# Patient Record
Sex: FEMALE | Race: WHITE | Employment: UNEMPLOYED | URBAN - METROPOLITAN AREA
[De-identification: names, ages, dates, MRNs, and addresses within clinical notes are randomized per-mention and may not be internally consistent; named-entity substitution may affect disease eponyms.]

---

## 2020-02-03 ENCOUNTER — APPOINTMENT (OUTPATIENT)
Dept: GENERAL RADIOLOGY | Facility: CLINIC | Age: 60
End: 2020-02-03
Attending: EMERGENCY MEDICINE
Payer: MEDICAID

## 2020-02-03 ENCOUNTER — HOSPITAL ENCOUNTER (INPATIENT)
Facility: CLINIC | Age: 60
LOS: 2 days | Discharge: HOME | End: 2020-02-05
Attending: EMERGENCY MEDICINE | Admitting: PSYCHIATRY & NEUROLOGY
Payer: MEDICAID

## 2020-02-03 DIAGNOSIS — F31.81 BIPOLAR 2 DISORDER (H): ICD-10-CM

## 2020-02-03 DIAGNOSIS — R45.851 SUICIDAL IDEATION: ICD-10-CM

## 2020-02-03 DIAGNOSIS — J44.9 CHRONIC OBSTRUCTIVE PULMONARY DISEASE, UNSPECIFIED COPD TYPE (H): ICD-10-CM

## 2020-02-03 DIAGNOSIS — F17.200 NICOTINE DEPENDENCE, UNCOMPLICATED, UNSPECIFIED NICOTINE PRODUCT TYPE: ICD-10-CM

## 2020-02-03 DIAGNOSIS — F10.939 ALCOHOL WITHDRAWAL WITH COMPLICATION WITH INPATIENT TREATMENT, WITH UNSPECIFIED COMPLICATION (H): ICD-10-CM

## 2020-02-03 DIAGNOSIS — K21.9 GASTROESOPHAGEAL REFLUX DISEASE, ESOPHAGITIS PRESENCE NOT SPECIFIED: Primary | ICD-10-CM

## 2020-02-03 DIAGNOSIS — F41.1 GAD (GENERALIZED ANXIETY DISORDER): ICD-10-CM

## 2020-02-03 DIAGNOSIS — F10.939 ALCOHOL WITHDRAWAL SYNDROME WITH COMPLICATION (H): ICD-10-CM

## 2020-02-03 LAB
ALBUMIN SERPL-MCNC: 3.9 G/DL (ref 3.4–5)
ALCOHOL BREATH TEST: 0.01 (ref 0–0.01)
ALP SERPL-CCNC: 130 U/L (ref 40–150)
ALT SERPL W P-5'-P-CCNC: 25 U/L (ref 0–50)
ANION GAP SERPL CALCULATED.3IONS-SCNC: 9 MMOL/L (ref 3–14)
AST SERPL W P-5'-P-CCNC: 33 U/L (ref 0–45)
BASOPHILS # BLD AUTO: 0 10E9/L (ref 0–0.2)
BASOPHILS NFR BLD AUTO: 0.2 %
BILIRUB SERPL-MCNC: 0.4 MG/DL (ref 0.2–1.3)
BUN SERPL-MCNC: 17 MG/DL (ref 7–30)
CALCIUM SERPL-MCNC: 9.6 MG/DL (ref 8.5–10.1)
CHLORIDE SERPL-SCNC: 103 MMOL/L (ref 94–109)
CHOLEST SERPL-MCNC: 257 MG/DL
CO2 SERPL-SCNC: 30 MMOL/L (ref 20–32)
CREAT SERPL-MCNC: 0.52 MG/DL (ref 0.52–1.04)
DIFFERENTIAL METHOD BLD: ABNORMAL
EOSINOPHIL # BLD AUTO: 0 10E9/L (ref 0–0.7)
EOSINOPHIL NFR BLD AUTO: 0.1 %
ERYTHROCYTE [DISTWIDTH] IN BLOOD BY AUTOMATED COUNT: 15.2 % (ref 10–15)
GFR SERPL CREATININE-BSD FRML MDRD: >90 ML/MIN/{1.73_M2}
GLUCOSE SERPL-MCNC: 157 MG/DL (ref 70–99)
HCT VFR BLD AUTO: 40.5 % (ref 35–47)
HDLC SERPL-MCNC: 125 MG/DL
HGB BLD-MCNC: 14.2 G/DL (ref 11.7–15.7)
IMM GRANULOCYTES # BLD: 0 10E9/L (ref 0–0.4)
IMM GRANULOCYTES NFR BLD: 0.2 %
LDLC SERPL CALC-MCNC: 115 MG/DL
LYMPHOCYTES # BLD AUTO: 1.3 10E9/L (ref 0.8–5.3)
LYMPHOCYTES NFR BLD AUTO: 10.1 %
MAGNESIUM SERPL-MCNC: 1.8 MG/DL (ref 1.6–2.3)
MCH RBC QN AUTO: 32.7 PG (ref 26.5–33)
MCHC RBC AUTO-ENTMCNC: 35.1 G/DL (ref 31.5–36.5)
MCV RBC AUTO: 93 FL (ref 78–100)
MONOCYTES # BLD AUTO: 0.7 10E9/L (ref 0–1.3)
MONOCYTES NFR BLD AUTO: 5.7 %
NEUTROPHILS # BLD AUTO: 10.3 10E9/L (ref 1.6–8.3)
NEUTROPHILS NFR BLD AUTO: 83.7 %
NONHDLC SERPL-MCNC: 132 MG/DL
NRBC # BLD AUTO: 0 10*3/UL
NRBC BLD AUTO-RTO: 0 /100
OSMOLALITY SERPL: 335 MMOL/KG (ref 275–295)
PLATELET # BLD AUTO: 276 10E9/L (ref 150–450)
POTASSIUM SERPL-SCNC: 3.7 MMOL/L (ref 3.4–5.3)
PROT SERPL-MCNC: 8.4 G/DL (ref 6.8–8.8)
RBC # BLD AUTO: 4.34 10E12/L (ref 3.8–5.2)
SODIUM SERPL-SCNC: 142 MMOL/L (ref 133–144)
TRIGL SERPL-MCNC: 84 MG/DL
TSH SERPL DL<=0.005 MIU/L-ACNC: 1.29 MU/L (ref 0.4–4)
WBC # BLD AUTO: 12.4 10E9/L (ref 4–11)

## 2020-02-03 PROCEDURE — 71046 X-RAY EXAM CHEST 2 VIEWS: CPT

## 2020-02-03 PROCEDURE — 25000128 H RX IP 250 OP 636: Performed by: PSYCHIATRY & NEUROLOGY

## 2020-02-03 PROCEDURE — 25000132 ZZH RX MED GY IP 250 OP 250 PS 637: Performed by: EMERGENCY MEDICINE

## 2020-02-03 PROCEDURE — 83735 ASSAY OF MAGNESIUM: CPT | Performed by: EMERGENCY MEDICINE

## 2020-02-03 PROCEDURE — 99285 EMERGENCY DEPT VISIT HI MDM: CPT | Mod: Z6 | Performed by: EMERGENCY MEDICINE

## 2020-02-03 PROCEDURE — 25000128 H RX IP 250 OP 636: Performed by: FAMILY MEDICINE

## 2020-02-03 PROCEDURE — 90791 PSYCH DIAGNOSTIC EVALUATION: CPT

## 2020-02-03 PROCEDURE — 82607 VITAMIN B-12: CPT | Performed by: EMERGENCY MEDICINE

## 2020-02-03 PROCEDURE — 12800008 ZZH R&B CD ADULT

## 2020-02-03 PROCEDURE — 25000132 ZZH RX MED GY IP 250 OP 250 PS 637: Performed by: PSYCHIATRY & NEUROLOGY

## 2020-02-03 PROCEDURE — 80061 LIPID PANEL: CPT | Performed by: EMERGENCY MEDICINE

## 2020-02-03 PROCEDURE — HZ2ZZZZ DETOXIFICATION SERVICES FOR SUBSTANCE ABUSE TREATMENT: ICD-10-PCS | Performed by: EMERGENCY MEDICINE

## 2020-02-03 PROCEDURE — 25000132 ZZH RX MED GY IP 250 OP 250 PS 637: Performed by: CLINICAL NURSE SPECIALIST

## 2020-02-03 PROCEDURE — 99285 EMERGENCY DEPT VISIT HI MDM: CPT | Mod: 25

## 2020-02-03 PROCEDURE — 84443 ASSAY THYROID STIM HORMONE: CPT | Performed by: EMERGENCY MEDICINE

## 2020-02-03 PROCEDURE — 25000128 H RX IP 250 OP 636: Performed by: EMERGENCY MEDICINE

## 2020-02-03 PROCEDURE — 85025 COMPLETE CBC W/AUTO DIFF WBC: CPT | Performed by: EMERGENCY MEDICINE

## 2020-02-03 PROCEDURE — 80053 COMPREHEN METABOLIC PANEL: CPT | Performed by: EMERGENCY MEDICINE

## 2020-02-03 PROCEDURE — 83930 ASSAY OF BLOOD OSMOLALITY: CPT | Performed by: EMERGENCY MEDICINE

## 2020-02-03 PROCEDURE — 82075 ASSAY OF BREATH ETHANOL: CPT

## 2020-02-03 RX ORDER — LANOLIN ALCOHOL/MO/W.PET/CERES
100 CREAM (GRAM) TOPICAL ONCE
Status: COMPLETED | OUTPATIENT
Start: 2020-02-03 | End: 2020-02-03

## 2020-02-03 RX ORDER — ACETAMINOPHEN 325 MG/1
650 TABLET ORAL EVERY 4 HOURS PRN
Status: DISCONTINUED | OUTPATIENT
Start: 2020-02-03 | End: 2020-02-05 | Stop reason: HOSPADM

## 2020-02-03 RX ORDER — FOLIC ACID 1 MG/1
1 TABLET ORAL ONCE
Status: COMPLETED | OUTPATIENT
Start: 2020-02-03 | End: 2020-02-03

## 2020-02-03 RX ORDER — FOLIC ACID 1 MG/1
1 TABLET ORAL ONCE
Status: DISCONTINUED | OUTPATIENT
Start: 2020-02-03 | End: 2020-02-03

## 2020-02-03 RX ORDER — NICOTINE 21 MG/24HR
1 PATCH, TRANSDERMAL 24 HOURS TRANSDERMAL DAILY
Status: DISCONTINUED | OUTPATIENT
Start: 2020-02-03 | End: 2020-02-05 | Stop reason: HOSPADM

## 2020-02-03 RX ORDER — ONDANSETRON 4 MG/1
4 TABLET, ORALLY DISINTEGRATING ORAL EVERY 6 HOURS PRN
Status: DISCONTINUED | OUTPATIENT
Start: 2020-02-03 | End: 2020-02-05 | Stop reason: HOSPADM

## 2020-02-03 RX ORDER — TRAZODONE HYDROCHLORIDE 50 MG/1
50 TABLET, FILM COATED ORAL
Status: DISCONTINUED | OUTPATIENT
Start: 2020-02-03 | End: 2020-02-04

## 2020-02-03 RX ORDER — LANOLIN ALCOHOL/MO/W.PET/CERES
100 CREAM (GRAM) TOPICAL ONCE
Status: DISCONTINUED | OUTPATIENT
Start: 2020-02-03 | End: 2020-02-03

## 2020-02-03 RX ORDER — HYDROXYZINE HYDROCHLORIDE 25 MG/1
25 TABLET, FILM COATED ORAL EVERY 4 HOURS PRN
Status: DISCONTINUED | OUTPATIENT
Start: 2020-02-03 | End: 2020-02-05 | Stop reason: HOSPADM

## 2020-02-03 RX ORDER — ONDANSETRON 4 MG/1
4 TABLET, ORALLY DISINTEGRATING ORAL ONCE
Status: COMPLETED | OUTPATIENT
Start: 2020-02-03 | End: 2020-02-03

## 2020-02-03 RX ORDER — DIAZEPAM 5 MG
5-20 TABLET ORAL EVERY 30 MIN PRN
Status: DISCONTINUED | OUTPATIENT
Start: 2020-02-03 | End: 2020-02-05 | Stop reason: HOSPADM

## 2020-02-03 RX ORDER — MULTIPLE VITAMINS W/ MINERALS TAB 9MG-400MCG
1 TAB ORAL DAILY
Status: DISCONTINUED | OUTPATIENT
Start: 2020-02-04 | End: 2020-02-05 | Stop reason: HOSPADM

## 2020-02-03 RX ORDER — DIAZEPAM 5 MG
5-20 TABLET ORAL EVERY 30 MIN PRN
Status: DISCONTINUED | OUTPATIENT
Start: 2020-02-03 | End: 2020-02-03

## 2020-02-03 RX ORDER — NICOTINE POLACRILEX 4 MG/1
20 GUM, CHEWING ORAL 2 TIMES DAILY
Status: ON HOLD | COMMUNITY
End: 2020-02-05

## 2020-02-03 RX ORDER — LANOLIN ALCOHOL/MO/W.PET/CERES
100 CREAM (GRAM) TOPICAL DAILY
Status: DISCONTINUED | OUTPATIENT
Start: 2020-02-04 | End: 2020-02-05 | Stop reason: HOSPADM

## 2020-02-03 RX ORDER — PROCHLORPERAZINE MALEATE 5 MG
5-10 TABLET ORAL EVERY 6 HOURS PRN
Status: DISCONTINUED | OUTPATIENT
Start: 2020-02-03 | End: 2020-02-05 | Stop reason: HOSPADM

## 2020-02-03 RX ORDER — ATENOLOL 50 MG/1
50 TABLET ORAL DAILY PRN
Status: DISCONTINUED | OUTPATIENT
Start: 2020-02-03 | End: 2020-02-05 | Stop reason: HOSPADM

## 2020-02-03 RX ORDER — LANOLIN ALCOHOL/MO/W.PET/CERES
100 CREAM (GRAM) TOPICAL DAILY
Status: DISCONTINUED | OUTPATIENT
Start: 2020-02-03 | End: 2020-02-03

## 2020-02-03 RX ORDER — LOPERAMIDE HCL 2 MG
2 CAPSULE ORAL 4 TIMES DAILY PRN
Status: DISCONTINUED | OUTPATIENT
Start: 2020-02-03 | End: 2020-02-05 | Stop reason: HOSPADM

## 2020-02-03 RX ORDER — MULTIPLE VITAMINS W/ MINERALS TAB 9MG-400MCG
1 TAB ORAL DAILY
Status: DISCONTINUED | OUTPATIENT
Start: 2020-02-03 | End: 2020-02-03

## 2020-02-03 RX ORDER — ALBUTEROL SULFATE 90 UG/1
2 AEROSOL, METERED RESPIRATORY (INHALATION) EVERY 6 HOURS PRN
Status: DISCONTINUED | OUTPATIENT
Start: 2020-02-03 | End: 2020-02-05 | Stop reason: HOSPADM

## 2020-02-03 RX ORDER — FOLIC ACID 1 MG/1
1 TABLET ORAL DAILY
Status: DISCONTINUED | OUTPATIENT
Start: 2020-02-04 | End: 2020-02-05 | Stop reason: HOSPADM

## 2020-02-03 RX ORDER — ALUMINA, MAGNESIA, AND SIMETHICONE 2400; 2400; 240 MG/30ML; MG/30ML; MG/30ML
30 SUSPENSION ORAL EVERY 4 HOURS PRN
Status: DISCONTINUED | OUTPATIENT
Start: 2020-02-03 | End: 2020-02-05 | Stop reason: HOSPADM

## 2020-02-03 RX ORDER — FOLIC ACID 1 MG/1
1 TABLET ORAL DAILY
Status: DISCONTINUED | OUTPATIENT
Start: 2020-02-03 | End: 2020-02-03

## 2020-02-03 RX ORDER — MAGNESIUM OXIDE 400 MG/1
400 TABLET ORAL 2 TIMES DAILY
COMMUNITY
End: 2022-03-14

## 2020-02-03 RX ADMIN — DIAZEPAM 5 MG: 5 TABLET ORAL at 11:57

## 2020-02-03 RX ADMIN — PROCHLORPERAZINE MALEATE 5 MG: 5 TABLET ORAL at 18:43

## 2020-02-03 RX ADMIN — ALBUTEROL SULFATE 2 PUFF: 90 AEROSOL, METERED RESPIRATORY (INHALATION) at 19:40

## 2020-02-03 RX ADMIN — NICOTINE 1 PATCH: 21 PATCH, EXTENDED RELEASE TRANSDERMAL at 14:57

## 2020-02-03 RX ADMIN — OMEPRAZOLE 20 MG: 20 CAPSULE, DELAYED RELEASE ORAL at 16:21

## 2020-02-03 RX ADMIN — MULTIPLE VITAMINS W/ MINERALS TAB 1 TABLET: TAB at 09:04

## 2020-02-03 RX ADMIN — DIAZEPAM 10 MG: 5 TABLET ORAL at 07:44

## 2020-02-03 RX ADMIN — ONDANSETRON 4 MG: 4 TABLET, ORALLY DISINTEGRATING ORAL at 14:55

## 2020-02-03 RX ADMIN — ONDANSETRON 4 MG: 4 TABLET, ORALLY DISINTEGRATING ORAL at 07:40

## 2020-02-03 RX ADMIN — DIAZEPAM 5 MG: 5 TABLET ORAL at 09:13

## 2020-02-03 RX ADMIN — ONDANSETRON 4 MG: 4 TABLET, ORALLY DISINTEGRATING ORAL at 11:57

## 2020-02-03 RX ADMIN — DIAZEPAM 10 MG: 5 TABLET ORAL at 19:32

## 2020-02-03 RX ADMIN — DIAZEPAM 10 MG: 5 TABLET ORAL at 16:50

## 2020-02-03 RX ADMIN — TRAZODONE HYDROCHLORIDE 50 MG: 50 TABLET ORAL at 19:34

## 2020-02-03 RX ADMIN — FOLIC ACID 1 MG: 1 TABLET ORAL at 09:04

## 2020-02-03 RX ADMIN — THIAMINE HCL TAB 100 MG 100 MG: 100 TAB at 09:04

## 2020-02-03 RX ADMIN — DIAZEPAM 10 MG: 5 TABLET ORAL at 10:39

## 2020-02-03 RX ADMIN — DIAZEPAM 10 MG: 5 TABLET ORAL at 14:55

## 2020-02-03 RX ADMIN — DIAZEPAM 10 MG: 5 TABLET ORAL at 13:08

## 2020-02-03 ASSESSMENT — ENCOUNTER SYMPTOMS
DYSPHORIC MOOD: 1
ABDOMINAL PAIN: 0
BLOOD IN STOOL: 0
DIARRHEA: 1
SHORTNESS OF BREATH: 0
NAUSEA: 1
VOMITING: 1
COUGH: 1
FEVER: 0

## 2020-02-03 ASSESSMENT — MIFFLIN-ST. JEOR: SCORE: 1444.98

## 2020-02-03 NOTE — ED NOTES
"Drank a few sips of rubbing alcohol than immediately \"threw up.\"  Per pt drank rubbing alcohol to \"stop the withdrawals.  "

## 2020-02-03 NOTE — PROGRESS NOTES
ISHAN      S = Situation: The patient is a 59 year-old female who comes to us from the ED to be detoxed from alcohol.  She has been drinking a liter a day Her last drink was  Yesterday.  She does not use any other drugs.  Her first MSSA was a 12 and she was given Valium 10 mg.     B  = Background: The patient lives by herself in a house.  She is a retired .  Her stressors are that she took care of her mother who  a year ago.  Then her brother .  He didn't have a will and the patient is now dealing with legal issues.  She has had one past inpatient treatment and one OP at [facility].  She has been treated as an outpatient for depression and anxiety.  She has a history of multiple operations for infertility and small bowel obstructions.  No other known medical issues.     A  =  Assessment: The patient is in active withdrawal.  She is anxious and has a history of panic attacks.       R =   Request or Recommendation: Obtained admission orders. Provided with snack. Counseled on smoking cessation. Introduced to IM&R Treatment program. Educated on Unit routines and expectations.

## 2020-02-03 NOTE — ED NOTES
Bed: ED16B  Expected date: 2/3/20  Expected time: 6:00 AM  Means of arrival:   Comments:  N716  59 F  SI/nausea

## 2020-02-03 NOTE — PROGRESS NOTES
02/03/20 1451   Valuables   Patient Belongings other (see comments)   Patient Belongings Remaining with Patient other (see comments)   Patient Belongings Put in Hospital Secure Location (Security or Locker, etc.) other (see comments)   Did you bring any home meds/supplements to the hospital?  No   Storage Bin: Jacket, purse w/ various belongings  Med Room: Wallet, cell phone, cigarettes, lighter, gold chain necklace    Security #[id number]: 1 [place] DL, 1 [employer] card, 1 [employer] card, 1  card, 1 Checkbook (begins w/ 5602) $6.00 CASH    CONTRABAND: 2 pocket knives, pepper spray    A               Admission:  I am responsible for any personal items that are not sent to the safe or pharmacy.  [facility] is not responsible for loss, theft or damage of any property in my possession.    Signature:  _________________________________ Date: _______  Time: _____                                              Staff Signature:  ____________________________ Date: ________  Time: _____      2nd Staff person, if patient is unable/unwilling to sign:    Signature: ________________________________ Date: ________  Time: _____     Discharge:  [facility] has returned all of my personal belongings:    Signature: _________________________________ Date: ________  Time: _____                                          Staff Signature:  ____________________________ Date: ________  Time: _____

## 2020-02-03 NOTE — ED NOTES
.  ED to Behavioral Floor Handoff    SITUATION  Chuyita Bailey is a 59 year old female who speaks English and lives in a home with others The patient arrived in the ED by ambulance from emergency room with a complaint of Suicidal (SI, ETOH withdrawal.)  .The patient's current symptoms started/worsened 2 month(s) ago and during this time the symptoms have increased.   In the ED, pt was diagnosed with   Final diagnoses:   None        Initial vitals were: BP: (!) 146/77  Heart Rate: 100  Temp: 96.9  F (36.1  C)  Resp: 16  SpO2: 94 %   --------  Is the patient diabetic? No   If yes, last blood glucose? --     If yes, was this treated in the ED? --  --------  Is the patient inebriated (ETOH) No or Impaired on other substances? No  MSSA done? Yes  Last MSSA score: --    Were withdrawal symptoms treated? Yes  Does the patient have a seizure history? No. If yes, date of most recent seizure--  --------  Is the patient patient experiencing suicidal ideation? reports occasional suicidal thoughts representing feeling that life is not worth feeling    Homicidal ideation? denies current or recent homicidal ideation or behaviors.    Self-injurious behavior/urges? denies current or recent self injurious behavior or ideation.  ------  Was pt aggressive in the ED No  Was a code called No  Is the pt now cooperative? Yes  -------  Meds given in ED:   Medications   diazepam (VALIUM) tablet 5-20 mg (10 mg Oral Given 2/3/20 0744)   multivitamin w/minerals (THERA-VIT-M) tablet 1 tablet (has no administration in time range)   vitamin B1 (THIAMINE) tablet 100 mg (has no administration in time range)   folic acid (FOLVITE) tablet 1 mg (has no administration in time range)   ondansetron (ZOFRAN-ODT) ODT tab 4 mg (4 mg Oral Given 2/3/20 0740)      Family present during ED course? No  Family currently present? No    BACKGROUND  Does the patient have a cognitive impairment or developmental disability? No  Allergies:   Allergies   Allergen  Reactions     Doxycycline Nausea and Vomiting     Keflex [Cephalexin] Nausea and Vomiting     Sulfa Drugs Hives   .   Social demographics are   Social History     Socioeconomic History     Marital status: None     Spouse name: None     Number of children: None     Years of education: None     Highest education level: None   Occupational History     None   Social Needs     Financial resource strain: None     Food insecurity:     Worry: None     Inability: None     Transportation needs:     Medical: None     Non-medical: None   Tobacco Use     Smoking status: Current Every Day Smoker     Packs/day: 2.00     Smokeless tobacco: Never Used   Substance and Sexual Activity     Alcohol use: Yes     Comment: 1 liter vodka per day     Drug use: Not Currently     Sexual activity: Yes   Lifestyle     Physical activity:     Days per week: None     Minutes per session: None     Stress: None   Relationships     Social connections:     Talks on phone: None     Gets together: None     Attends Latter day service: None     Active member of club or organization: None     Attends meetings of clubs or organizations: None     Relationship status: None     Intimate partner violence:     Fear of current or ex partner: None     Emotionally abused: None     Physically abused: None     Forced sexual activity: None   Other Topics Concern     None   Social History Narrative     None        ASSESSMENT  Labs results   Labs Ordered and Resulted from Time of ED Arrival Up to the Time of Departure from the ED   CBC WITH PLATELETS DIFFERENTIAL - Abnormal; Notable for the following components:       Result Value    WBC 12.4 (*)     RDW 15.2 (*)     Absolute Neutrophil 10.3 (*)     All other components within normal limits   COMPREHENSIVE METABOLIC PANEL - Abnormal; Notable for the following components:    Glucose 157 (*)     All other components within normal limits   OSMOLALITY   DRUG ABUSE SCREEN 6 CHEM DEP URINE ([facility])   MSSA SCORE AND VS   NOTIFY       Imaging Studies: No results found for this or any previous visit (from the past 24 hour(s)).   Most recent vital signs BP (!) 146/77   Temp 96.9  F (36.1  C) (Oral)   Resp 16   SpO2 94%    Abnormal labs/tests/findings requiring intervention:---   Pain control: good  Nausea control: good    RECOMMENDATION  Are any infection precautions needed (MRSA, VRE, etc.)? No If yes, what infection? --  ---  Does the patient have mobility issues? independently. If yes, what device does the pt use? ---  ---  Is patient on 72 hour hold or commitment? No If on 72 hour hold, have hold and rights been given to patient? No  Are admitting orders written if after 10 p.m. ?N/A  Tasks needing to be completed:---     Vinicio Ge RN   [facility]   [phone] [facility]   [phone] [facility]

## 2020-02-03 NOTE — ED PROVIDER NOTES
History     Chief Complaint   Patient presents with     Suicidal     SI, ETOH withdrawal.     HPI  Chuyita Bailey is a 59 year old female with PMH chronic alcohol abuse, depression, previous suicide attempt, HTN, and asthma who p/w c/o alcohol withdrawal and SI.  Patient normally drinks a liter of vodka daily.  She reports her last drink was 24 hours ago.  She is currently experiencing symptoms of withdrawal that include nausea, vomiting, shakiness, diarrhea.  She denies blood in her stool or vomit.  Denies fever.  On review of systems, she reports a right lower tooth ache and a fractured crown that has been present for a few weeks and has not been associated with fever or purulent drainage. Patient has been told she needs to have a repeat root canal on this tooth. She also reports a recent productive cough but denies chest pain or shortness of breath.  She denies a history of alcohol withdrawal seizures.  Denies other drug use.  She does report passive suicidal ideation but does not have a plan and would not act on it.  She is currently going through bereavement with the recent loss of 2 close family members. She did drink a few sips of rubbing alcohol prior to arrival but spit this out. She was not trying to harm herself but was attempting to treat withdrawal symptoms and had run out of ethanol in home. Reports mild discomfort in back of throat but has been tolerating PO without difficulty while in ED.      I have reviewed the Medications, Allergies, Past Medical and Surgical History, and Social History in the Epic system.    Review of Systems   Constitutional: Negative for fever.   HENT: Positive for dental problem.    Respiratory: Positive for cough. Negative for shortness of breath.    Cardiovascular: Negative for chest pain.   Gastrointestinal: Positive for diarrhea, nausea and vomiting. Negative for abdominal pain and blood in stool.   Psychiatric/Behavioral: Positive for dysphoric mood and suicidal  ideas. Negative for self-injury.   All other systems reviewed and are negative.      Physical Exam   BP: (!) 146/77  Pulse: 109  Heart Rate: 100  Temp: 96.9  F (36.1  C)  Resp: 16  SpO2: 94 %      Physical Exam  Vitals signs and nursing note reviewed.   Constitutional:       General: She is not in acute distress.     Appearance: She is well-developed. She is not ill-appearing or diaphoretic.      Comments: disheveled   HENT:      Head: Normocephalic and atraumatic.      Jaw: No trismus, swelling or malocclusion.      Nose: Nose normal. No rhinorrhea.      Mouth/Throat:      Mouth: Mucous membranes are dry.      Dentition: Abnormal dentition. No dental abscesses.      Pharynx: Oropharynx is clear. No oropharyngeal exudate.     Eyes:      General: No scleral icterus.     Conjunctiva/sclera: Conjunctivae normal.      Pupils: Pupils are equal, round, and reactive to light.   Neck:      Musculoskeletal: Normal range of motion and neck supple. No neck rigidity.   Cardiovascular:      Rate and Rhythm: Regular rhythm. Tachycardia present.      Heart sounds: Normal heart sounds. No murmur.   Pulmonary:      Effort: Pulmonary effort is normal. No respiratory distress.      Breath sounds: Normal breath sounds. No stridor. No wheezing, rhonchi or rales.   Abdominal:      General: There is no distension.      Palpations: Abdomen is soft.      Tenderness: There is no abdominal tenderness. There is no guarding or rebound.   Musculoskeletal: Normal range of motion.         General: No deformity.   Skin:     General: Skin is warm and dry.      Coloration: Skin is not jaundiced or pale.      Findings: No rash.   Neurological:      General: No focal deficit present.      Mental Status: She is alert and oriented to person, place, and time.   Psychiatric:         Attention and Perception: Attention normal.         Mood and Affect: Mood normal.         Speech: Speech normal.         Behavior: Behavior normal. Behavior is cooperative.          Thought Content: Thought content is not paranoid. Thought content includes suicidal ideation. Thought content does not include suicidal plan.         ED Course        Procedures             Critical Care time:  none             Labs Ordered and Resulted from Time of ED Arrival Up to the Time of Departure from the ED   CBC WITH PLATELETS DIFFERENTIAL - Abnormal; Notable for the following components:       Result Value    WBC 12.4 (*)     RDW 15.2 (*)     Absolute Neutrophil 10.3 (*)     All other components within normal limits   COMPREHENSIVE METABOLIC PANEL - Abnormal; Notable for the following components:    Glucose 157 (*)     All other components within normal limits   OSMOLALITY - Abnormal; Notable for the following components:    Osmolality 335 (*)     All other components within normal limits   ALCOHOL BREATH TEST POCT - Normal   DRUG ABUSE SCREEN 6 CHEM DEP URINE ([facility])   MSSA SCORE AND VS   NOTIFY            Assessments & Plan (with Medical Decision Making)   Chuyita Bailey is a 59 year old female with PMH chronic alcohol abuse, depression, previous suicide attempt, HTN, and asthma who p/w c/o alcohol withdrawal and SI.     Ddx: SI, bereavement, alcohol withdrawal, dehydration, electrolyte abnormality, pancreatitis, hepatitis, pna, asthma exacerbation/URI    [facility] evaluated patient and was able to have her contract for safety. SI is very passive and I am not concerned that she is unsafe to go to detox. She does desire detox and treatment for alcohol. No e/o toxic alcohol poisoning. Labs wnl. Given valium per MSSA and vitamins. CXR clear. S/p hysterectomy. Medically cleared for ongoing treatment in detox unit.     I have reviewed the nursing notes.    I have reviewed the findings, diagnosis, plan and need for follow up with the patient.    New Prescriptions    No medications on file       Final diagnoses:   Alcohol withdrawal syndrome with complication (H)   Suicidal ideation       2/3/2020   [facility],  [place], EMERGENCY DEPARTMENT     Pita Wu MD  02/03/20 0996

## 2020-02-04 ENCOUNTER — APPOINTMENT (OUTPATIENT)
Dept: CARDIOLOGY | Facility: CLINIC | Age: 60
End: 2020-02-04
Attending: PHYSICIAN ASSISTANT
Payer: MEDICAID

## 2020-02-04 LAB
FOLATE SERPL-MCNC: 12.6 NG/ML
NT-PROBNP SERPL-MCNC: 88 PG/ML (ref 0–900)
VIT B12 SERPL-MCNC: 362 PG/ML (ref 193–986)

## 2020-02-04 PROCEDURE — 99222 1ST HOSP IP/OBS MODERATE 55: CPT | Performed by: PHYSICIAN ASSISTANT

## 2020-02-04 PROCEDURE — 83880 ASSAY OF NATRIURETIC PEPTIDE: CPT | Performed by: PHYSICIAN ASSISTANT

## 2020-02-04 PROCEDURE — 93306 TTE W/DOPPLER COMPLETE: CPT | Mod: 26 | Performed by: INTERNAL MEDICINE

## 2020-02-04 PROCEDURE — 93306 TTE W/DOPPLER COMPLETE: CPT

## 2020-02-04 PROCEDURE — 36415 COLL VENOUS BLD VENIPUNCTURE: CPT | Performed by: PSYCHIATRY & NEUROLOGY

## 2020-02-04 PROCEDURE — 99207 ZZC CONSULT E&M CHANGED TO INITIAL LEVEL: CPT | Performed by: PHYSICIAN ASSISTANT

## 2020-02-04 PROCEDURE — 93005 ELECTROCARDIOGRAM TRACING: CPT

## 2020-02-04 PROCEDURE — 25000132 ZZH RX MED GY IP 250 OP 250 PS 637: Performed by: PSYCHIATRY & NEUROLOGY

## 2020-02-04 PROCEDURE — 93010 ELECTROCARDIOGRAM REPORT: CPT | Performed by: INTERNAL MEDICINE

## 2020-02-04 PROCEDURE — 12800008 ZZH R&B CD ADULT

## 2020-02-04 PROCEDURE — 82746 ASSAY OF FOLIC ACID SERUM: CPT | Performed by: PSYCHIATRY & NEUROLOGY

## 2020-02-04 RX ORDER — LAMOTRIGINE 25 MG/1
25 TABLET ORAL DAILY
Status: DISCONTINUED | OUTPATIENT
Start: 2020-02-04 | End: 2020-02-05 | Stop reason: HOSPADM

## 2020-02-04 RX ADMIN — SERTRALINE HYDROCHLORIDE 50 MG: 50 TABLET ORAL at 13:53

## 2020-02-04 RX ADMIN — ACETAMINOPHEN 650 MG: 325 TABLET, FILM COATED ORAL at 05:45

## 2020-02-04 RX ADMIN — FOLIC ACID 1 MG: 1 TABLET ORAL at 09:09

## 2020-02-04 RX ADMIN — NICOTINE 1 PATCH: 21 PATCH, EXTENDED RELEASE TRANSDERMAL at 09:09

## 2020-02-04 RX ADMIN — HYDROXYZINE HYDROCHLORIDE 25 MG: 25 TABLET, FILM COATED ORAL at 21:46

## 2020-02-04 RX ADMIN — OMEPRAZOLE 20 MG: 20 CAPSULE, DELAYED RELEASE ORAL at 09:08

## 2020-02-04 RX ADMIN — THIAMINE HCL TAB 100 MG 100 MG: 100 TAB at 09:08

## 2020-02-04 RX ADMIN — OMEPRAZOLE 20 MG: 20 CAPSULE, DELAYED RELEASE ORAL at 16:38

## 2020-02-04 RX ADMIN — HYDROXYZINE HYDROCHLORIDE 25 MG: 25 TABLET, FILM COATED ORAL at 16:38

## 2020-02-04 RX ADMIN — LAMOTRIGINE 25 MG: 25 TABLET ORAL at 13:53

## 2020-02-04 RX ADMIN — ACETAMINOPHEN 650 MG: 325 TABLET, FILM COATED ORAL at 09:09

## 2020-02-04 RX ADMIN — DIAZEPAM 10 MG: 5 TABLET ORAL at 01:06

## 2020-02-04 RX ADMIN — HYDROXYZINE HYDROCHLORIDE 25 MG: 25 TABLET, FILM COATED ORAL at 09:09

## 2020-02-04 RX ADMIN — DIAZEPAM 10 MG: 5 TABLET ORAL at 05:45

## 2020-02-04 RX ADMIN — MULTIPLE VITAMINS W/ MINERALS TAB 1 TABLET: TAB at 09:09

## 2020-02-04 RX ADMIN — ACETAMINOPHEN 650 MG: 325 TABLET, FILM COATED ORAL at 21:46

## 2020-02-04 ASSESSMENT — ACTIVITIES OF DAILY LIVING (ADL)
LAUNDRY: WITH SUPERVISION
HYGIENE/GROOMING: INDEPENDENT
DRESS: SCRUBS (BEHAVIORAL HEALTH)
ORAL_HYGIENE: INDEPENDENT

## 2020-02-04 NOTE — PROGRESS NOTES
"Pt reports plan for after detox is complete is to \"get support from my family and go back to the [facility]\". Pt denies any suicidal ideation plans or intent. Endorses anxiety 5, depression 7 of 10. Also endorses feeling some sadness but does endors feeling some happiness and hopefulness. Appears disheveled and untidy in appearance, neglected grooming. Is isolative to her room most of the shift apparently sleeping in her bed. MSSA scores today are 6 and 6.  "

## 2020-02-04 NOTE — PLAN OF CARE
Behavioral Team Discussion: (2/4/2020)    Continued Stay Criteria/Rationale: Patient admitted for alcohol withdrawal, complicated.  Plan: The following services will be provided to the patient; psychiatric assessment, medication management, therapeutic milieu, individual and group support, and skills groups.   Participants: 3A Provider: Dr. Philip Kim MD; 3A RN's: Fredrick Mahajan RN; 3A CM's: Cathi Kellogg.  Summary/Recommendation: Providers will assess today for treatment recommendations, discharge planning, and aftercare plans. CM will meet with pt for discharge planning.   Medical/Physical: Internal medicine consult completed 2/4/2020 AM by Claudia Dow PA-C.   Precautions:   Behavioral Orders   Procedures     Code 1 - Restrict to Unit     Routine Programming     As clinically indicated     Status 15     Every 15 minutes.     Withdrawal precautions     Rationale for change in precautions or plan: N/A  Progress: No Change.

## 2020-02-04 NOTE — PHARMACY-ADMISSION MEDICATION HISTORY
Admission Medication History status for the 2/3/2020 admission is complete.  See EPIC admission navigator for Prior to Admission medications.  Patient's Name: Chuyita Bailey  Patient's : 1960   59 year old, female    Medication History Sources: Patient and care everywhere  Primary Pharmacy: [facility] [address]    Medication history source reliability: Moderate  Medication adherence:  Moderate    Changes made to PTA medication list (reason)  Added: Omeprazole  Deleted: None  Changed: Magnesium to Magnesium oxide 400 mg per care everywhere    High Risk Medications (Warfarin, Immunosuppressants, Insulin, Antibiotics, or Other)    None    Additional medication history information (including actions taken by pharmacist):    None    Time spent in this activity: 20 minutes    Medication history completed by:  Cristina Cunningham PD2 Pharmacy Intern    Prior to Admission Medications   Prescriptions Last Dose Informant Patient Reported? Taking?   magnesium oxide (MAG-OX) 400 MG tablet Past Week at Unknown time Self Yes Yes   Sig: Take 400 mg by mouth 2 times daily   omeprazole 20 MG tablet Past Week at Unknown time Self Yes Yes   Sig: Take 20 mg by mouth 2 times daily      Facility-Administered Medications: None

## 2020-02-04 NOTE — H&P
Admitted:     02/03/2020      Ms. Chuyita Cruz, date of birth 1960, is a 59-year-old woman admitted to [facility] detox unit on 02/03/2020.  She was admitted to detoxify from alcohol.  She had been sober for 10 years and 01/25 of this year was the anniversary of her mother's death followed one month later by the death of her brother.  She relapsed around the time of this anniversary after her 10 years of sobriety.  She has been drinking 1 liter of vodka per day.      MEDICAL HISTORY:  Noted for asthma and hypertension.  She is currently receiving a cardiac ultrasound due to a murmur heard on admission.      Anxiety symptoms consist of being afraid to leave her home, anxious, afraid to drive, and palpitations.  Depression symptoms consist of not feeling like doing anything, being lonely, missing her family, tearfulness, sleep increase or decrease, weight increase or decrease, onset was in 2004 with a divorce.  She has done well in the past with Wellbutrin and sertraline.      FAMILY HISTORY:  Noted for a brother with bipolar illness.  The patient previously has taken Celexa, Lexapro, Cymbalta, BuSpar, gabapentin, and the Zoloft and Wellbutrin listed above.      VITAL SIGNS:  Blood pressure 145/85, temperature 97.5, pulse 99, heart rate 105, respirations 16, SpO2 of 92 on room air, weight 201 kg.      LABORATORY:  Labwork so far shows EKG with sinus rhythm and QTc is 479.      The patient's scores positive for 7 of the 13 items on the MDQ.      MENTAL STATUS EXAMINATION:  Shows an alert woman lying in bed.  Eye contact is good.  Motor behavior is normal.  There is no sign of psychosis.  She is not suicidal.  Her affect becomes sad when talking about the loss of family members.      DIAGNOSTIC IMPRESSION:   1.  Alcohol use disorder, severe with withdrawal.   2.  Bipolar illness, type 2.   3.  Generalized anxiety disorder.      PLAN:  Plan at this time is to start Lamictal for the  bipolar illness, type 2.  Side effects were discussed including risk of Ventura-Albino syndrome which was explained.  She was asked to stop the medicine if she develops a rash or sores in the mouth.  Sertraline was restarted as it did not cause more racing thoughts or problems in the past and seemed to help her.  Naltrexone and Campral were discussed and naltrexone was started for alcohol.      Wellbutrin is a possibility for after detox to restart this.  Estimated length of stay in the hospital is 3-5 days to detox using an MSSA protocol.      Trazodone will stop in view of the QTc.         JONATHON SNOW MD             D: 2020   T: 2020   MT: NTS      Name:     BHUMI TRINH   MRN:      [id number]        Account:      [id number]   :      1960        Admitted:     2020                   Document: [id number]

## 2020-02-04 NOTE — CONSULTS
Formerly [facility]  Internal Medicine Consult     Chuyita Bailey MRN# [id number]   Age: 59 year old YOB: 1960     Date of Admission: 2/3/2020  Date of Consult: 2/4/2020    Primary Care Provider: Clinic, [facility] [address]    Requesting Service: Dr. Kim  Reason for Consult: General Medical Evaluation      SUBJECTIVE   CC:   GERD   Assessment and Plan/Recommendations:   Chuyita Bailey is a 59 year old female with history of alcohol abuse, depression, HTN, asthma, and GERD who was admitted to station 3A with acute alcohol withdrawal     Alcohol abuse, depression: With acute withdrawal. Drinking 1L daily PTA  - Management per psych     MINOR, peripheral edema, systolic murmur: Does not follow with a PCP. MINOR worsening over the past several months and occurs with carrying laundry. Peripheral edema chronic but slightly worse than BL. LLE edema > RLE edema which is chronic for several years and has been worked up with numerous ultrasounds. No dyspnea at rest  - BNP   - EKG  - Echo  ** Addendum: EKG NSR with QTc 479. Avoid prolonging meds. Echo 2/4 EF 55-60%, no significant valve abnormalities, normal RV and LV. BNP 88. Given normal workup medicine will sign off, please contact with future questions or changes     GERD: Continue PPI    HTN: No PTA meds. BP mildly elevated in the setting of withdrawal  - Contact medicine if BP spikes >175/>110 or is consistently >150s/>90s upon completion of detox  - Follow up with PCP for ongoing care    Asthma: Stable. Continue PTA albuterol     Tooth ache: Without e/o abscess. See ED note for details  - Follow up OP with dentist    HLD: , . Lifestyle modifications. Trend with PCP in 6 weeks     Leukocytosis: WBC 12.4, ANC 10.3. No e/o acute infection. CXR 2/3 clear. VSS. Likely stress response 2/2 withdrawal. Contact medicine if infectious symptoms arise      Please contact if future questions or concerns arise. Thank you for the  opportunity to be a part of this patient's care.      Claudia Dow PA-C  Internal Medicine [facility]ist  [phone]  February 4, 2020         HPI:   Chuyita Bailey is a 59 year old female with history of alcohol abuse, depression, HTN, asthma, and GERD who was admitted to station 3A with acute alcohol withdrawal     Patient reports feeling nauseated. She vomited last night but was able to tolerate breakfast this morning. She is mild tremulous. Denies confusion. No fever or chills. Reports asthma slightly worse than yesterday starting after she vomited. She denies dyspnea, wheezing or chest tightness at this time. No fever or chills. No sore throat or sinus pressure. No rash or skin changes. Denies urinary symptoms. Had diarrhea PTA but this stopped once she started eating full meals. Report having MINOR with doing and carrying laundry over the past few months. She reports worsening swelling in the BLE along the same timeline. Left leg is always more swollen than the right and this has been worked up several times. No current chest pain. Denies current dyspnea. Able to sleep flat       Past Medical History:     Past Medical History:   Diagnosis Date     Depressive disorder      ETOH abuse         Reviewed and updated in [facility].     Past Surgical History:      Past Surgical History:   Procedure Laterality Date     HYSTERECTOMY           Social History:   Tobacco use: Yes  Alcohol use: as above  Elicit drug use: Denies     Family History:   History reviewed. No pertinent family history.      Allergies:     Allergies   Allergen Reactions     Doxycycline Nausea and Vomiting     Keflex [Cephalexin] Nausea and Vomiting     Sulfa Drugs Hives         Medications:   Reviewed. Please see MAR     Review of Systems:   10 point ROS of systems including Constitutional, Eyes, Respiratory, Cardiovascular, Gastroenterology, Genitourinary, Integumentary, Muscularskeletal, Psychiatric were all negative except for pertinent  "positives noted in my HPI.    OBJECTIVE   Physical Exam:   Vitals were reviewed  Blood pressure (!) 145/85, pulse 99, temperature 97.5  F (36.4  C), temperature source Oral, resp. rate 16, height 1.575 m (5' 2\"), weight 91.2 kg (201 lb), SpO2 92 %.  General: Alert and oriented x3, disheveled   HEENT: Anicteric sclera, EOMI, membranes moist  Cardiovascular: RRR, S1S2. III/VI systolic murmur  Lungs: CTAB without wheezing or crackles   GI: Abdomen soft, non-tender with bowel sounds present. No guarding or rebound present  Vascular: LLE>RLE 1+ lower extremity peripheral edema, distal pulses palpable  Neurologic: No focal deficits, CN II-XII grossly intact  Neuropsychiatric: Per psych  Skin: No jaundice, rashes, or lesions        Data:        Lab Results   Component Value Date     02/03/2020    Lab Results   Component Value Date    CHLORIDE 103 02/03/2020    Lab Results   Component Value Date    BUN 17 02/03/2020      Lab Results   Component Value Date    POTASSIUM 3.7 02/03/2020    Lab Results   Component Value Date    CO2 30 02/03/2020    Lab Results   Component Value Date    CR 0.52 02/03/2020        Lab Results   Component Value Date    WBC 12.4 (H) 02/03/2020    HGB 14.2 02/03/2020    HCT 40.5 02/03/2020    MCV 93 02/03/2020     02/03/2020     Lab Results   Component Value Date    WBC 12.4 (H) 02/03/2020      "

## 2020-02-04 NOTE — PROGRESS NOTES
Case Management Note  2/4/2020    Met with pt to discuss discharge planning. Pt reports a plan to start IOP at [facility]. Pt reports she first needs to speak with her attorneys to get access to pay for the IOP. Pt declined any assistance from writer coordinating [facility], pt has been there before and knows most of the people there. Pt declined any other resources from this writer. Writer explained CM role to pt as this is her first admission to  and encouraged pt to seek out writer if she would like any case management assistance. Pt acknowledged understanding.     Cathi Kellogg, LPC, [facility]

## 2020-02-05 VITALS
TEMPERATURE: 98.1 F | WEIGHT: 201 LBS | BODY MASS INDEX: 36.99 KG/M2 | RESPIRATION RATE: 16 BRPM | SYSTOLIC BLOOD PRESSURE: 147 MMHG | HEIGHT: 62 IN | OXYGEN SATURATION: 92 % | DIASTOLIC BLOOD PRESSURE: 70 MMHG | HEART RATE: 90 BPM

## 2020-02-05 LAB — INTERPRETATION ECG - MUSE: NORMAL

## 2020-02-05 PROCEDURE — 25000128 H RX IP 250 OP 636: Performed by: PSYCHIATRY & NEUROLOGY

## 2020-02-05 PROCEDURE — 25000132 ZZH RX MED GY IP 250 OP 250 PS 637: Performed by: PSYCHIATRY & NEUROLOGY

## 2020-02-05 RX ORDER — MULTIPLE VITAMINS W/ MINERALS TAB 9MG-400MCG
1 TAB ORAL DAILY
Qty: 90 EACH | Refills: 3 | Status: SHIPPED | OUTPATIENT
Start: 2020-02-06 | End: 2022-03-14

## 2020-02-05 RX ORDER — GABAPENTIN 100 MG/1
100 CAPSULE ORAL 3 TIMES DAILY
Qty: 90 CAPSULE | Refills: 3 | Status: SHIPPED | OUTPATIENT
Start: 2020-02-05 | End: 2022-03-14

## 2020-02-05 RX ORDER — FOLIC ACID 1 MG/1
1 TABLET ORAL DAILY
Qty: 90 TABLET | Refills: 3 | Status: SHIPPED | OUTPATIENT
Start: 2020-02-06 | End: 2022-03-14

## 2020-02-05 RX ORDER — ALBUTEROL SULFATE 90 UG/1
2 AEROSOL, METERED RESPIRATORY (INHALATION) EVERY 6 HOURS PRN
Qty: 1 INHALER | Refills: 3 | Status: SHIPPED | OUTPATIENT
Start: 2020-02-05

## 2020-02-05 RX ORDER — NALTREXONE HYDROCHLORIDE 50 MG/1
50 TABLET, FILM COATED ORAL DAILY
Qty: 30 TABLET | Refills: 3 | Status: SHIPPED | OUTPATIENT
Start: 2020-02-06 | End: 2022-03-14

## 2020-02-05 RX ORDER — LANOLIN ALCOHOL/MO/W.PET/CERES
100 CREAM (GRAM) TOPICAL DAILY
Qty: 90 TABLET | Refills: 3 | Status: SHIPPED | OUTPATIENT
Start: 2020-02-06 | End: 2022-03-14

## 2020-02-05 RX ORDER — GABAPENTIN 100 MG/1
100 CAPSULE ORAL 3 TIMES DAILY
Status: DISCONTINUED | OUTPATIENT
Start: 2020-02-05 | End: 2020-02-05 | Stop reason: HOSPADM

## 2020-02-05 RX ORDER — NICOTINE 21 MG/24HR
1 PATCH, TRANSDERMAL 24 HOURS TRANSDERMAL DAILY
Qty: 30 PATCH | Refills: 3 | Status: SHIPPED | OUTPATIENT
Start: 2020-02-06 | End: 2022-03-14

## 2020-02-05 RX ORDER — LAMOTRIGINE 25 MG/1
TABLET ORAL
Qty: 60 TABLET | Refills: 3 | Status: SHIPPED | OUTPATIENT
Start: 2020-02-05 | End: 2022-03-14

## 2020-02-05 RX ADMIN — FOLIC ACID 1 MG: 1 TABLET ORAL at 09:04

## 2020-02-05 RX ADMIN — HYDROXYZINE HYDROCHLORIDE 25 MG: 25 TABLET, FILM COATED ORAL at 04:34

## 2020-02-05 RX ADMIN — OMEPRAZOLE 20 MG: 20 CAPSULE, DELAYED RELEASE ORAL at 09:04

## 2020-02-05 RX ADMIN — THIAMINE HCL TAB 100 MG 100 MG: 100 TAB at 09:04

## 2020-02-05 RX ADMIN — SERTRALINE HYDROCHLORIDE 50 MG: 50 TABLET ORAL at 09:04

## 2020-02-05 RX ADMIN — ACETAMINOPHEN 650 MG: 325 TABLET, FILM COATED ORAL at 09:05

## 2020-02-05 RX ADMIN — ONDANSETRON 4 MG: 4 TABLET, ORALLY DISINTEGRATING ORAL at 09:07

## 2020-02-05 RX ADMIN — Medication 25 MG: at 09:04

## 2020-02-05 RX ADMIN — NICOTINE 1 PATCH: 21 PATCH, EXTENDED RELEASE TRANSDERMAL at 09:05

## 2020-02-05 RX ADMIN — LAMOTRIGINE 25 MG: 25 TABLET ORAL at 09:04

## 2020-02-05 RX ADMIN — HYDROXYZINE HYDROCHLORIDE 25 MG: 25 TABLET, FILM COATED ORAL at 09:05

## 2020-02-05 RX ADMIN — MULTIPLE VITAMINS W/ MINERALS TAB 1 TABLET: TAB at 09:04

## 2020-02-05 RX ADMIN — ACETAMINOPHEN 650 MG: 325 TABLET, FILM COATED ORAL at 04:33

## 2020-02-05 ASSESSMENT — ACTIVITIES OF DAILY LIVING (ADL)
HYGIENE/GROOMING: INDEPENDENT
ORAL_HYGIENE: INDEPENDENT
LAUNDRY: WITH SUPERVISION
DRESS: SCRUBS (BEHAVIORAL HEALTH)

## 2020-02-05 NOTE — DISCHARGE INSTRUCTIONS
Behavioral Discharge Planning and Instructions  THANK YOU FOR CHOOSING [address]  [phone]    Summary: You were admitted to [facility] on 2/3/2020 for detoxification from alcohol.  A medical exam was performed that included lab work. You have met with a  and opted to pursue IOP at [facility].  Please take care and make your recovery a daily priority, Chuyita! It was a pleasure working with you and the entire treatment team here wishes you the very best in your recovery!     Recommendation:  Coordinate IOP admission with [facility] and follow all recommendations for continued care.     Main Diagnoses:  Per Dr. Julio MD:   1.  Alcohol use disorder, severe with withdrawal.   2.  Bipolar illness, type 2.   3.  Generalized anxiety disorder.     Major Treatments, Procedures and Findings:  You were treated for alcohol detoxification using valium administered based on the [facility] protocol. You did not complete a chemical dependency assessment. You had labs drawn and those results were reviewed with you. Please take a copy of your lab work with you to your next primary care physician appointment.    Symptoms to Report:  If you experience more anxiety, confusion, sleeplessness, deep sadness or thoughts of suicide, notify your treatment team or notify your primary care physician. IF ANY OF THE SYMPTOMS YOU ARE EXPERIENCING ARE A MEDICAL EMERGENCY CALL 911 IMMEDIATELY.     Lifestyle Adjustment: Adjust your lifestyle to get enough sleep, relaxation, exercise and good nutrition. Continue to develop healthy coping skills to decrease stress and promote a sober living environment. Do not use mood altering substances including alcohol, illegal drugs or addictive medications other than what is currently prescribed.     Disposition: Home, admit to [facility]  [facility]  Phone: [phone] or [phone]  Email: [email or web address]  Address: [address] [place] [address]    Follow-up  Appointment:   You declined to make a follow up prior to discharge. Please be seen by a primary care provider within 3 weeks of discharge.    Resources:   AA/NA and Sponsors are excellent resources for support and you can find one at any support group meeting.   Alcoholics Anonymous (www.alcoholics-anonymous.org): for local information 24 hours/day  AA Intergroup service office in [place] (http://www.aastpaul.org/) [phone]  AA Intergroup service office in [facility]: [phone]. (http://www.a[place].org/)  Narcotics Anonymous (www.nam[place].org) (291) 905-2487  https://aafairviewriverside.org/meetings  SMART Recovery - self management for addiction recovery:  www.Internet Broadcastingrecovery.org  Pathways ~ A Health Crisis Resource & Support Center:  344.804.7790.  https://prescribetoprevent.org/patient-education/videos/  http://www.harmreduction.org  [facility] [phone]  Support Group:  AA/NA and Sponsor/support.  National Liberty on Mental Illness (www.mn.shirin.org): 243.414.8560 or 168-696-2011.  Alcoholics Anonymous (www.alcoholics-anonymous.org): Check your phone book for your local chapter.  Suicide Awareness Voices of Education (SAVE) (www.save.org): 774-212-ENGB (0283)  National Suicide Prevention Line (www.mentalhealthmn.org): 179-814-LZNW (4741)  Mental Health Consumer/Survivor Network of [place] (www.mhcsn.net): 418.196.1956 or 341-597-8572  Mental Health Association of [place] (www.mentalhealth.org): 238.630.5490 or 002-209-2610   Substance Abuse and Mental Health Services (www.samhsa.gov)  [place] Opioid Prevention Coalition: www.opioidcoalition.org    [place] Recovery Connection (MRC)  [facility] connects people seeking recovery to resources that help foster and sustain long-term recovery.  Whether you are seeking resources for treatment, transportation, housing, job training, education, health care or other pathways to recovery, [facility] is a great place to start.   514-726-8335.  www.[facility].org    General Medication Instructions:   See your medication sheet(s) for instructions.   Take all medications as prescribed.  Make no changes unless your doctor suggests them.   Go to all your doctor visits.  Be sure to have all your required lab tests. This way, your medicines can be refilled on time.  Do not use any forms of alcohol.    Please Note:  If you have any questions at anytime after you are discharged please call [facility] detox unit 3AW at [phone].  [facility], Behavioral Intake [phone]  Medical Records call [phone]  Outpatient Behavioral Intake call [phone]  LP+ Wait List/Bed Availability call [phone]    Please remember to take all of your behavioral discharge planning and lab paperwork to any follow up appointments, it contains your lab results, diagnosis, medication list and discharge recommendations.      THANK YOU FOR CHOOSING [facility]

## 2020-02-05 NOTE — PLAN OF CARE
Patient has not required any valium for alcohol detox since yesterday 02/04/20 at 0545 . All MSSA scores since that time have been less than 8. Pt is now removed from alcohol detox monitoring status.

## 2020-02-05 NOTE — DISCHARGE SUMMARY
Philip Kim MD   Physician   Psychiatry   H&P   Signed   Date of Service:  2/4/2020 10:56 AM   Creation Time:  2/4/2020 11:17 AM                 []Hide copied text    []Satya for details  Admitted:     02/03/2020      Ms. Chuyita Cruz, date of birth 1960, is a 59-year-old woman admitted to [facility] detox unit on 02/03/2020.  She was admitted to detoxify from alcohol.  She had been sober for 10 years and 01/25 of this year was the anniversary of her mother's death followed one month later by the death of her brother.  She relapsed around the time of this anniversary after her 10 years of sobriety.  She has been drinking 1 liter of vodka per day.      MEDICAL HISTORY:  Noted for asthma and hypertension.  She is currently receiving a cardiac ultrasound due to a murmur heard on admission.      Anxiety symptoms consist of being afraid to leave her home, anxious, afraid to drive, and palpitations.  Depression symptoms consist of not feeling like doing anything, being lonely, missing her family, tearfulness, sleep increase or decrease, weight increase or decrease, onset was in 2004 with a divorce.  She has done well in the past with Wellbutrin and sertraline.      FAMILY HISTORY:  Noted for a brother with bipolar illness.  The patient previously has taken Celexa, Lexapro, Cymbalta, BuSpar, gabapentin, and the Zoloft and Wellbutrin listed above.      VITAL SIGNS:  Blood pressure 145/85, temperature 97.5, pulse 99, heart rate 105, respirations 16, SpO2 of 92 on room air, weight 201 kg.      LABORATORY:  Labwork so far shows EKG with sinus rhythm and QTc is 479.      The patient's scores positive for 7 of the 13 items on the MDQ.      MENTAL STATUS EXAMINATION:  Shows an alert woman lying in bed.  Eye contact is good.  Motor behavior is normal.  There is no sign of psychosis.  She is not suicidal.  Her affect becomes sad when talking about the loss of family members.      DIAGNOSTIC  IMPRESSION:   1.  Alcohol use disorder, severe with withdrawal.   2.  Bipolar illness, type 2.   3.  Generalized anxiety disorder.      PLAN:  Plan at this time is to start Lamictal for the bipolar illness, type 2.  Side effects were discussed including risk of Ventura-Albino syndrome which was explained.  She was asked to stop the medicine if she develops a rash or sores in the mouth.  Sertraline was restarted as it did not cause more racing thoughts or problems in the past and seemed to help her.  Naltrexone and Campral were discussed and naltrexone was started for alcohol.      Wellbutrin is a possibility for after detox to restart this.  Estimated length of stay in the hospital is 3-5 days to detox using an MSSA protocol.      Trazodone will stop in view of the QTc.         JONATHON SNOW MD                She stabilized and completed discharge.  Bipolar 2 was diagnosed.      Current Facility-Administered Medications:      acetaminophen (TYLENOL) tablet 650 mg, 650 mg, Oral, Q4H PRN, Jonathon Snow MD, 650 mg at 02/05/20 0905     albuterol (PROAIR HFA/PROVENTIL HFA/VENTOLIN HFA) 108 (90 Base) MCG/ACT inhaler 2 puff, 2 puff, Inhalation, Q6H PRN, Naegele, Debra Ann, APRN CNS, 2 puff at 02/03/20 1940     alum & mag hydroxide-simethicone (MYLANTA ES/MAALOX  ES) suspension 30 mL, 30 mL, Oral, Q4H PRN, Jonathon Snow MD     atenolol (TENORMIN) tablet 50 mg, 50 mg, Oral, Daily PRN, Jonathon Snow MD     diazepam (VALIUM) tablet 5-20 mg, 5-20 mg, Oral, Q30 Min PRN, Jonathon Snow MD, 10 mg at 02/04/20 0545     folic acid (FOLVITE) tablet 1 mg, 1 mg, Oral, Daily, Jonathon Snow MD, 1 mg at 02/05/20 0904     gabapentin (NEURONTIN) capsule 100 mg, 100 mg, Oral, TID, Jonathon Snow MD     hydrOXYzine (ATARAX) tablet 25 mg, 25 mg, Oral, Q4H PRN, Jonathon Snow MD, 25 mg at 02/05/20 0905     lamoTRIgine (LaMICtal) tablet 25 mg, 25 mg, Oral, Daily, Jonathon Snow MD, 25 mg at 02/05/20 0904     loperamide (IMODIUM) capsule 2  mg, 2 mg, Oral, 4x Daily PRN, Philip Kim MD     magnesium hydroxide (MILK OF MAGNESIA) suspension 30 mL, 30 mL, Oral, At Bedtime PRN, Philip Kim MD     multivitamin w/minerals (THERA-VIT-M) tablet 1 tablet, 1 tablet, Oral, Daily, Philip Kim MD, 1 tablet at 02/05/20 0904     naltrexone (DEPADE;REVIA) half-tab 25 mg, 25 mg, Oral, Daily, Philip Kim MD, 25 mg at 02/05/20 0904     nicotine (NICODERM CQ) 21 MG/24HR 24 hr patch 1 patch, 1 patch, Transdermal, Daily, Philip Kim MD, 1 patch at 02/05/20 0905     nicotine (NICORETTE) gum 2-4 mg, 2-4 mg, Buccal, Q2H PRN, Philip Kim MD     nicotine Patch in Place, , Transdermal, Q8H, Philip Kim MD, Stopped at 02/04/20 0704     omeprazole (priLOSEC) CR capsule 20 mg, 20 mg, Oral, BID AC, Philip Kim MD, 20 mg at 02/05/20 0904     ondansetron (ZOFRAN-ODT) ODT tab 4 mg, 4 mg, Oral, Q6H PRN, Philip Kim MD, 4 mg at 02/05/20 0907     prochlorperazine (COMPAZINE) tablet 5-10 mg, 5-10 mg, Oral, Q6H PRN, 5 mg at 02/03/20 1843 **OR** prochlorperazine (COMPAZINE) injection 5-10 mg, 5-10 mg, Intramuscular, Q6H PRN, Naegele, Debra Ann, APRN CNS     sertraline (ZOLOFT) tablet 50 mg, 50 mg, Oral, Daily, Philip Kim MD, 50 mg at 02/05/20 0904     vitamin B1 (THIAMINE) tablet 100 mg, 100 mg, Oral, Daily, Philip Kim MD, 100 mg at 02/05/20 0904  Recent Results (from the past 168 hour(s))   CBC with platelets differential    Collection Time: 02/03/20  7:31 AM   Result Value Ref Range    WBC 12.4 (H) 4.0 - 11.0 10e9/L    RBC Count 4.34 3.8 - 5.2 10e12/L    Hemoglobin 14.2 11.7 - 15.7 g/dL    Hematocrit 40.5 35.0 - 47.0 %    MCV 93 78 - 100 fl    MCH 32.7 26.5 - 33.0 pg    MCHC 35.1 31.5 - 36.5 g/dL    RDW 15.2 (H) 10.0 - 15.0 %    Platelet Count 276 150 - 450 10e9/L    Diff Method Automated Method     % Neutrophils 83.7 %    % Lymphocytes 10.1 %    % Monocytes 5.7 %    % Eosinophils 0.1 %    % Basophils 0.2 %    % Immature Granulocytes 0.2 %    Nucleated RBCs  0 0 /100    Absolute Neutrophil 10.3 (H) 1.6 - 8.3 10e9/L    Absolute Lymphocytes 1.3 0.8 - 5.3 10e9/L    Absolute Monocytes 0.7 0.0 - 1.3 10e9/L    Absolute Eosinophils 0.0 0.0 - 0.7 10e9/L    Absolute Basophils 0.0 0.0 - 0.2 10e9/L    Abs Immature Granulocytes 0.0 0 - 0.4 10e9/L    Absolute Nucleated RBC 0.0    Comprehensive metabolic panel    Collection Time: 02/03/20  7:31 AM   Result Value Ref Range    Sodium 142 133 - 144 mmol/L    Potassium 3.7 3.4 - 5.3 mmol/L    Chloride 103 94 - 109 mmol/L    Carbon Dioxide 30 20 - 32 mmol/L    Anion Gap 9 3 - 14 mmol/L    Glucose 157 (H) 70 - 99 mg/dL    Urea Nitrogen 17 7 - 30 mg/dL    Creatinine 0.52 0.52 - 1.04 mg/dL    GFR Estimate >90 >60 mL/min/[1.73_m2]    GFR Estimate If Black >90 >60 mL/min/[1.73_m2]    Calcium 9.6 8.5 - 10.1 mg/dL    Bilirubin Total 0.4 0.2 - 1.3 mg/dL    Albumin 3.9 3.4 - 5.0 g/dL    Protein Total 8.4 6.8 - 8.8 g/dL    Alkaline Phosphatase 130 40 - 150 U/L    ALT 25 0 - 50 U/L    AST 33 0 - 45 U/L   Osmolality    Collection Time: 02/03/20  7:31 AM   Result Value Ref Range    Osmolality 335 (H) 275 - 295 mmol/kg   Lipid Profile    Collection Time: 02/03/20  7:31 AM   Result Value Ref Range    Cholesterol 257 (H) <200 mg/dL    Triglycerides 84 <150 mg/dL    HDL Cholesterol 125 >49 mg/dL    LDL Cholesterol Calculated 115 (H) <100 mg/dL    Non HDL Cholesterol 132 (H) <130 mg/dL   Magnesium    Collection Time: 02/03/20  7:31 AM   Result Value Ref Range    Magnesium 1.8 1.6 - 2.3 mg/dL   TSH with free T4 reflex    Collection Time: 02/03/20  7:31 AM   Result Value Ref Range    TSH 1.29 0.40 - 4.00 mU/L   Vitamin B12    Collection Time: 02/03/20  7:31 AM   Result Value Ref Range    Vitamin B12 362 193 - 986 pg/mL   Alcohol breath test POCT    Collection Time: 02/03/20  8:30 AM   Result Value Ref Range    Alcohol Breath Test 0.006 0.00 - 0.01   Folate    Collection Time: 02/04/20  6:49 AM   Result Value Ref Range    Folate 12.6 >5.4 ng/mL   NT proBNP  inpatient and ED    Collection Time: 02/04/20  6:49 AM   Result Value Ref Range    N-Terminal Pro BNP Inpatient 88 0 - 900 pg/mL   EKG 12-lead, tracing only    Collection Time: 02/04/20  9:47 AM   Result Value Ref Range    Interpretation ECG Click View Image link to view waveform and result

## 2020-02-05 NOTE — PROGRESS NOTES
Pt given copy of their discharge instructions and medication administration instructions. All discharge plans and labs were discussed with patient. Pt reports no questions at this time regarding discharge plans, labs or medications. Pt denies any suicidal ideation, plans or intent at this time. Patient discharge assisted via Gena ANDRE directly to the lobby. Patient plan is to return home and pursue IOP at the [facility]. Patient is discharged at this time.

## 2022-01-01 ENCOUNTER — LAB REQUISITION (OUTPATIENT)
Dept: LAB | Facility: CLINIC | Age: 62
End: 2022-01-01
Payer: COMMERCIAL

## 2022-01-01 DIAGNOSIS — C51.9 MALIGNANT NEOPLASM OF VULVA, UNSPECIFIED (H): ICD-10-CM

## 2022-01-01 DIAGNOSIS — C15.9 MALIGNANT NEOPLASM OF ESOPHAGUS, UNSPECIFIED (H): ICD-10-CM

## 2022-01-01 LAB
ALBUMIN SERPL BCG-MCNC: 2.4 G/DL (ref 3.5–5.2)
ALP SERPL-CCNC: 148 U/L (ref 35–104)
ALT SERPL W P-5'-P-CCNC: 7 U/L (ref 10–35)
ANION GAP SERPL CALCULATED.3IONS-SCNC: 9 MMOL/L (ref 7–15)
AST SERPL W P-5'-P-CCNC: 28 U/L (ref 10–35)
BASOPHILS # BLD AUTO: 0 10E3/UL (ref 0–0.2)
BASOPHILS # BLD AUTO: 0 10E3/UL (ref 0–0.2)
BASOPHILS NFR BLD AUTO: 0 %
BASOPHILS NFR BLD AUTO: 1 %
BILIRUB SERPL-MCNC: 0.2 MG/DL
BUN SERPL-MCNC: 9.1 MG/DL (ref 8–23)
CALCIUM SERPL-MCNC: 8.5 MG/DL (ref 8.8–10.2)
CHLORIDE SERPL-SCNC: 99 MMOL/L (ref 98–107)
CREAT SERPL-MCNC: 0.52 MG/DL (ref 0.51–0.95)
DEPRECATED HCO3 PLAS-SCNC: 31 MMOL/L (ref 22–29)
EOSINOPHIL # BLD AUTO: 0.4 10E3/UL (ref 0–0.7)
EOSINOPHIL # BLD AUTO: 0.4 10E3/UL (ref 0–0.7)
EOSINOPHIL NFR BLD AUTO: 5 %
EOSINOPHIL NFR BLD AUTO: 6 %
ERYTHROCYTE [DISTWIDTH] IN BLOOD BY AUTOMATED COUNT: 16.1 % (ref 10–15)
ERYTHROCYTE [DISTWIDTH] IN BLOOD BY AUTOMATED COUNT: 17.4 % (ref 10–15)
FERRITIN SERPL-MCNC: 520 NG/ML (ref 11–328)
GFR SERPL CREATININE-BSD FRML MDRD: >90 ML/MIN/1.73M2
GLUCOSE SERPL-MCNC: 77 MG/DL (ref 70–99)
HCT VFR BLD AUTO: 28.5 % (ref 35–47)
HCT VFR BLD AUTO: 29 % (ref 35–47)
HGB BLD-MCNC: 8.5 G/DL (ref 11.7–15.7)
HGB BLD-MCNC: 8.6 G/DL (ref 11.7–15.7)
IMM GRANULOCYTES # BLD: 0 10E3/UL
IMM GRANULOCYTES # BLD: 0 10E3/UL
IMM GRANULOCYTES NFR BLD: 0 %
IMM GRANULOCYTES NFR BLD: 0 %
IRON SATN MFR SERPL: 21 % (ref 15–46)
IRON SERPL-MCNC: 21 UG/DL (ref 35–180)
LYMPHOCYTES # BLD AUTO: 0.5 10E3/UL (ref 0.8–5.3)
LYMPHOCYTES # BLD AUTO: 0.6 10E3/UL (ref 0.8–5.3)
LYMPHOCYTES NFR BLD AUTO: 6 %
LYMPHOCYTES NFR BLD AUTO: 8 %
MCH RBC QN AUTO: 26.2 PG (ref 26.5–33)
MCH RBC QN AUTO: 27.5 PG (ref 26.5–33)
MCHC RBC AUTO-ENTMCNC: 29.3 G/DL (ref 31.5–36.5)
MCHC RBC AUTO-ENTMCNC: 30.2 G/DL (ref 31.5–36.5)
MCV RBC AUTO: 89 FL (ref 78–100)
MCV RBC AUTO: 91 FL (ref 78–100)
MONOCYTES # BLD AUTO: 0.8 10E3/UL (ref 0–1.3)
MONOCYTES # BLD AUTO: 1 10E3/UL (ref 0–1.3)
MONOCYTES NFR BLD AUTO: 10 %
MONOCYTES NFR BLD AUTO: 13 %
NEUTROPHILS # BLD AUTO: 5.6 10E3/UL (ref 1.6–8.3)
NEUTROPHILS # BLD AUTO: 6.3 10E3/UL (ref 1.6–8.3)
NEUTROPHILS NFR BLD AUTO: 73 %
NEUTROPHILS NFR BLD AUTO: 78 %
NRBC # BLD AUTO: 0 10E3/UL
NRBC # BLD AUTO: 0 10E3/UL
NRBC BLD AUTO-RTO: 0 /100
NRBC BLD AUTO-RTO: 0 /100
PLATELET # BLD AUTO: 324 10E3/UL (ref 150–450)
PLATELET # BLD AUTO: 351 10E3/UL (ref 150–450)
POTASSIUM SERPL-SCNC: 4.4 MMOL/L (ref 3.4–5.3)
PROT SERPL-MCNC: 6.5 G/DL (ref 6.4–8.3)
RBC # BLD AUTO: 3.13 10E6/UL (ref 3.8–5.2)
RBC # BLD AUTO: 3.25 10E6/UL (ref 3.8–5.2)
SODIUM SERPL-SCNC: 139 MMOL/L (ref 136–145)
TIBC SERPL-MCNC: 98 UG/DL (ref 240–430)
WBC # BLD AUTO: 7.6 10E3/UL (ref 4–11)
WBC # BLD AUTO: 8 10E3/UL (ref 4–11)

## 2022-01-01 PROCEDURE — P9604 ONE-WAY ALLOW PRORATED TRIP: HCPCS | Mod: ORL

## 2022-01-01 PROCEDURE — P9603 ONE-WAY ALLOW PRORATED MILES: HCPCS | Mod: ORL | Performed by: FAMILY MEDICINE

## 2022-01-01 PROCEDURE — 85025 COMPLETE CBC W/AUTO DIFF WBC: CPT | Mod: ORL | Performed by: FAMILY MEDICINE

## 2022-01-01 PROCEDURE — 36415 COLL VENOUS BLD VENIPUNCTURE: CPT | Mod: ORL | Performed by: FAMILY MEDICINE

## 2022-01-01 PROCEDURE — 82728 ASSAY OF FERRITIN: CPT | Mod: ORL

## 2022-01-01 PROCEDURE — 80053 COMPREHEN METABOLIC PANEL: CPT | Mod: ORL

## 2022-01-01 PROCEDURE — 85025 COMPLETE CBC W/AUTO DIFF WBC: CPT | Mod: ORL

## 2022-01-01 PROCEDURE — 83550 IRON BINDING TEST: CPT | Mod: ORL | Performed by: FAMILY MEDICINE

## 2022-01-01 PROCEDURE — 36415 COLL VENOUS BLD VENIPUNCTURE: CPT | Mod: ORL

## 2022-01-01 PROCEDURE — P9604 ONE-WAY ALLOW PRORATED TRIP: HCPCS | Mod: ORL | Performed by: FAMILY MEDICINE

## 2022-01-17 ENCOUNTER — TRANSFERRED RECORDS (OUTPATIENT)
Dept: HEALTH INFORMATION MANAGEMENT | Facility: CLINIC | Age: 62
End: 2022-01-17
Payer: COMMERCIAL

## 2022-02-28 ENCOUNTER — TRANSFERRED RECORDS (OUTPATIENT)
Dept: HEALTH INFORMATION MANAGEMENT | Facility: CLINIC | Age: 62
End: 2022-02-28
Payer: COMMERCIAL

## 2022-03-09 ENCOUNTER — TRANSCRIBE ORDERS (OUTPATIENT)
Dept: OTHER | Age: 62
End: 2022-03-09
Payer: COMMERCIAL

## 2022-03-09 DIAGNOSIS — C15.9 SQUAMOUS CELL ESOPHAGEAL CANCER (H): Primary | ICD-10-CM

## 2022-03-11 NOTE — PROGRESS NOTES
Dear Colleagues,  Today Chuyita Bailey was seen in consultation.    IDENTIFICATION: This is a 62yo woman with bipolar disorder and tobacco abuse recently diagnosed with distal esophageal SCCA clinical stage [id number] referred for neoadjuvant chemoradiation.    HISTORY OF PRESENT ILLNESS: Ms. Bailey is a current smoker (0.5ppd x 40years) with h/o ETOH abuse (she is 2 years sober). On January 17, 2022 she initially presented with a parapneumonic effusion with partial loculation causing shortness of breath and dyspnea.  There is an initial chest abdomen pelvis CT completed on this date.  In-house she also reported weight loss and dysphagia.      During her admission on Janury 18, 2022 she had a diagnostic and therapeutic thoracentesis with chest tube placement.  Path was negative for infection or any malignancy.  She also had a diagnostic EGD that found a large obstructing fungating mass in the distal esophagus and biopsies were consistent with squamous cell carcinoma. There was sonographic evidence suggesting invasion into the adventitia and for enlarged lymph nodes visualized in the AP region and subcarinal mediastinum, level 5 and level 7 respectively.  She was eventually treated with antibiotics with completion of her treatment on 2/14/2022.  Her hospitalization was complicated by hypoxic failure, volume overload and aspiration pneumonia.  She also had kidney injury.    On January 27, 2022 PET/CT showing an esophageal mass consistent with malignancy, small left axillary lymph node with moderate uptake and a small loculated right pleural effusion and mild peripheral uptake.  Left axillary ultrasound could not identify the lymph node and it was thought that this was most likely a false positive.    On January 31, 2022 she had an EUS w/ FNA of the AP window and subcarinal nodes completed and they were negative for metastatic disease and granuloma.      She saw Dr. Mayda Gonsales in consultation on February 28, 2022 and  plan is for neoadjuvant concurrent chemoradiation with carbotaxol.  She has also been seen by surgery already.    On March 7, 2022, She had a repeat chest abdomen pelvis CT completed on which showed large distal esophageal mass, small diaphragmatic lower paraesophageal lymph nodes, small upper abdominal nodes overall not significantly changed.  A mildly prominent right hilar lymph node was unchanged.    Currently she has a J-tube and is only able to take sips of water PO. She has significant dysphagia and odynophagia. She has nausea and vomiting and denies signficant pain. She denies any other new masses, skin changes, shortness of breath, chest or bony pain, or new neurologic symptoms. She is being seen here today for consideration of definitive chemoradiotherapy.      REVIEW OF SYSTEMS: As per HPI, a 14-point review of system is otherwise negative.    PAST RADIATION THERAPY:  Denies.    PAST CTD/PACEMAKER: Denies    Past Medical History:   Diagnosis Date     Alcohol use disorder, severe, in sustained remission (H) 1/17/2022    Formatting of this note might be different from the original. Reportedly sober for 1 year as of January 2022     Allergic rhinitis 6/28/2004    Formatting of this note might be different from the original. Rhinitis Allergic  NOS     Bipolar disorder (H) 7/10/2020     Degenerative disk disease 12/6/2012     Depressive disorder      Empyema of lung (H) 1/17/2022     Esophageal reflux 6/7/2003    Formatting of this note might be different from the original. Gastroesophageal Reflux Disease Formatting of this note might be different from the original. Formatting of this note might be different from the original. Gastroesophageal Reflux Disease     ETOH abuse      Ganglion 10/18/2004    Formatting of this note might be different from the original. Ganglion Cyst     Insomnia due to other mental disorder 7/12/2019     Lactic acidosis 1/17/2022     Major depressive disorder, recurrent episode,  moderate (H) 9/25/2007    Formatting of this note might be different from the original. Major depressive disorder, single episode, unspecified     Panic disorder 7/12/2019     Squamous cell carcinoma of esophagus (H) 3/11/2022     Tobacco use disorder, severe, dependence 6/28/2004    Formatting of this note might be different from the original. Tobacco Abuse     Vitamin D deficiency 7/3/2008     Weight loss 1/17/2022       Past Surgical History:   Procedure Laterality Date     EGD       ENDOSCOPIC ULTRASOUND, SIGMOIDOSCOPY, COMBINED       HYSTERECTOMY       INSERTION OF ACCESS PORT       TUBES         Family History   Problem Relation Age of Onset     Breast Cancer Mother      Lung Cancer Father      Breast Cancer Maternal Aunt      Lung Cancer Maternal Aunt        Social History     Tobacco Use     Smoking status: Current Every Day Smoker     Packs/day: 0.50     Years: 40.00     Pack years: 20.00     Smokeless tobacco: Never Used   Substance Use Topics     Alcohol use: Not Currently     Current Outpatient Medications   Medication     Nutritional Supplements (NUTREN 1.5) LIQD     ondansetron (ZOFRAN-ODT) 4 MG ODT tab     oxyCODONE (ROXICODONE) 5 MG tablet     albuterol (PROAIR HFA/PROVENTIL HFA/VENTOLIN HFA) 108 (90 Base) MCG/ACT inhaler     dexamethasone (DECADRON) 4 MG tablet     hydrOXYzine (VISTARIL) 25 MG capsule     loperamide (IMODIUM) 2 MG capsule     No current facility-administered medications for this visit.        Allergies   Allergen Reactions     Benzonatate Itching     Diazoxide      Other reaction(s): *Unknown     Doxycycline Nausea and Vomiting     Hydrochlorothiazide Muscle Pain (Myalgia)     Keflex [Cephalexin] Nausea and Vomiting     Morphine Itching     Sulfa Drugs Hives     Triamterene-Hctz Other (See Comments)     Muscle Spasms     PHYSICAL EXAMINATION:  /73 (BP Location: Left arm, Patient Position: Sitting, Cuff Size: Adult Regular)   Pulse 100   Temp 99.5  F (37.5  C) (Oral)   Resp  16   Wt 80.2 kg (176 lb 14.4 oz)   SpO2 97%   BMI 32.36 kg/m    GENERAL        Well-appearing woman in no acute distress.  HEENT              Normocephalic, atraumatic.  Sclerae anicteric.  CVR                   Regular rate and rhythm.  No murmurs, rubs, or gallops.  LUNGS             Clear to auscultation bilaterally.  LYMPH             No supraclavicular, infraclavicular, or axillary lymphadenopathy appreciated bilaterally.  ABDOMEN      Soft.  J tube in place  EXT                    No clubbing or cyanosis or edema.    NEURO             No focal deficits.  MSK                   Good ROM.   SKIN                  Warm and well perfused.    PSYCH               Alert and oriented x 3     ECOG PERFORMANCE STATUS: 1     IMPRESSION/PLAN: Chuyita Bailey 60yo woman with bipolar disorder and tobacco abuse recently diagnosed with distal esophageal SCCA clinical stage [id number] referred for neoadjuvant chemoradiation.    I agree with concurrent chemoradiation. Results from the landmark CROSS Trial (nhi Tejada et. Al, [facility], 2012) have shown that patients with esophageal cancer treated with chemoradiotherapy can achieve a pathologic complete response in 29% of cases. Patients with SCC of the esophagus (vs. adenocarcinoma) faired better with chemoRT with a pCR of ~50%. In this context, combining chemotherapy with radiation therapy is essential. The superiority of definitive chemoRT over definitive RT alone was demonstrated in RT0 85-01. Although there were limitations to this study, in it patients with unresectable esophageal cancer were treated with either RT alone (64Gy), or RT (50Gy) with concurrent 5-FU/cisplatin. Median survival for the RT alone group was 8.9 months, vs. 12.5 for combined therapy. Importantly, 5yr survival was 0% for the RT alone group and 26% for the combined therapy group.     Today I explained the rationale, as well as the logistics, risks, benefits, and alternatives to definitive chemoradiation for  esophageal cancer. I encouraged Ms. Bailey to ask questions, which I answered to the best of my ability and to her satisfaction. She consented to treatment and completed a CT simulation treatment planning scan. Plan will be for 5.5 weeks of radiation concurrent with weekly carboplatin/paclitaxel. Our team will coordinate a start date with her Med Onc.  Potential treatment related toxicities include but are not limited to, skin erythema or desquamation, nausea, vomiting, odynophagia, worsening dysphagia, esophageal stricture or stenosis, esophageal perforation, pneumonitis, cardiac toxicity, fistula formation, development of pericardial effusion, rib fracture, spinal cord injury, and secondary cancer.     Additional problem list to be addressed in the following manner:     1. Carbo/Taxol Systemic treatment : Per Med Onc Dr. Mayda Gonsales  2. Active smoker: Today I questioned Chuyita Bailey about her current smoking habits.  Ms. Bailey is an active smoker and has some interest in smoking cessation.  We discussed the data regarding smoking and increased risk to several cancers.  We also discussed the data regarding delayed wound healing in active smokers.  All together 5-10 minutes was spent discussing the patients smoking status and she was advised about smoking cessation. We discussed the use of : Nicotine gum and Chantix.  3. Last PET >6 weeks ago.  Will need repeat PET/CT for treatment planning.    There was ample time for questions and all were answered to the patient's satisfaction. Thank you for allowing me to participate in the care of this pleasant patient. If you have any questions, please do not hesitate to contact my office.     Sincerely,  Sandra Nicolas MD

## 2022-03-14 ENCOUNTER — APPOINTMENT (OUTPATIENT)
Dept: RADIATION ONCOLOGY | Facility: CLINIC | Age: 62
End: 2022-03-14
Payer: COMMERCIAL

## 2022-03-14 ENCOUNTER — PATIENT OUTREACH (OUTPATIENT)
Dept: RADIATION ONCOLOGY | Facility: CLINIC | Age: 62
End: 2022-03-14

## 2022-03-14 ENCOUNTER — OFFICE VISIT (OUTPATIENT)
Dept: RADIATION ONCOLOGY | Facility: CLINIC | Age: 62
End: 2022-03-14
Payer: COMMERCIAL

## 2022-03-14 VITALS
BODY MASS INDEX: 32.36 KG/M2 | DIASTOLIC BLOOD PRESSURE: 73 MMHG | SYSTOLIC BLOOD PRESSURE: 121 MMHG | HEART RATE: 100 BPM | RESPIRATION RATE: 16 BRPM | TEMPERATURE: 99.5 F | WEIGHT: 176.9 LBS | OXYGEN SATURATION: 97 %

## 2022-03-14 DIAGNOSIS — C15.9 SQUAMOUS CELL ESOPHAGEAL CANCER (H): ICD-10-CM

## 2022-03-14 PROBLEM — R63.4 WEIGHT LOSS: Status: ACTIVE | Noted: 2022-01-17

## 2022-03-14 PROBLEM — J86.9 EMPYEMA OF LUNG (H): Status: ACTIVE | Noted: 2022-01-17

## 2022-03-14 PROBLEM — E87.20 LACTIC ACIDOSIS: Status: ACTIVE | Noted: 2022-01-17

## 2022-03-14 PROBLEM — K44.9 HIATAL HERNIA: Status: ACTIVE | Noted: 2022-01-17

## 2022-03-14 PROBLEM — T58.92XA: Status: ACTIVE | Noted: 2017-08-26

## 2022-03-14 PROBLEM — F10.21 ALCOHOL USE DISORDER, SEVERE, IN SUSTAINED REMISSION (H): Status: ACTIVE | Noted: 2022-01-17

## 2022-03-14 PROBLEM — F99 INSOMNIA DUE TO OTHER MENTAL DISORDER: Status: ACTIVE | Noted: 2019-07-12

## 2022-03-14 PROBLEM — D72.829 LEUKOCYTOSIS: Status: ACTIVE | Noted: 2022-01-17

## 2022-03-14 PROBLEM — F51.05 INSOMNIA DUE TO OTHER MENTAL DISORDER: Status: ACTIVE | Noted: 2019-07-12

## 2022-03-14 PROBLEM — N17.9 AKI (ACUTE KIDNEY INJURY) (H): Status: ACTIVE | Noted: 2022-01-17

## 2022-03-14 PROBLEM — F31.9 BIPOLAR DISORDER (H): Status: ACTIVE | Noted: 2020-07-10

## 2022-03-14 PROBLEM — F41.0 PANIC DISORDER: Status: ACTIVE | Noted: 2019-07-12

## 2022-03-14 PROCEDURE — 77334 RADIATION TREATMENT AID(S): CPT | Performed by: RADIOLOGY

## 2022-03-14 PROCEDURE — 99205 OFFICE O/P NEW HI 60 MIN: CPT | Mod: 25 | Performed by: RADIOLOGY

## 2022-03-14 PROCEDURE — 77332 RADIATION TREATMENT AID(S): CPT | Mod: 59 | Performed by: RADIOLOGY

## 2022-03-14 PROCEDURE — 77263 THER RADIOLOGY TX PLNG CPLX: CPT | Performed by: RADIOLOGY

## 2022-03-14 RX ORDER — HYDROXYZINE PAMOATE 25 MG/1
25 CAPSULE ORAL
COMMUNITY
Start: 2022-02-03

## 2022-03-14 RX ORDER — DEXAMETHASONE 4 MG/1
TABLET ORAL
COMMUNITY
Start: 2022-03-01

## 2022-03-14 RX ORDER — ONDANSETRON 4 MG/1
4 TABLET, ORALLY DISINTEGRATING ORAL
COMMUNITY
Start: 2022-02-06

## 2022-03-14 RX ORDER — NUTRITIONAL SUPPLEMENT 0.07 G-1.5
LIQUID (ML) ORAL
COMMUNITY
Start: 2022-02-08

## 2022-03-14 RX ORDER — LOPERAMIDE HCL 2 MG
CAPSULE ORAL
COMMUNITY
Start: 2022-02-04

## 2022-03-14 RX ORDER — OXYCODONE HYDROCHLORIDE 5 MG/1
2.5-5 TABLET ORAL
COMMUNITY
Start: 2022-02-06

## 2022-03-14 ASSESSMENT — PAIN SCALES - GENERAL: PAINLEVEL: MODERATE PAIN (4)

## 2022-03-14 NOTE — NURSING NOTE
REASON FOR APPOINTMENT   Type of Cancer: Esophageal  Location: Esophageal  Date of Symptom Onset: 1/18/2022 hospitalized for weight loss, dysphagia, nausea/vomiting, and SOB    TREATMENT TO-DATE FOR THIS CANCER  Surgery ? no   Chemotherapy ? no   Other Treatments for this Cancer ? no    PERSONAL HISTORY OF CANCER   Previous Cancer ? no   Prior Radiation ? no   Prior Chemotherapy ? no   Prior Hormonal Therapy ? no     RECENT IMAGING STUDIES  CT scan (date: 3/7/2022, location: [facility])    REFERRALS NEEDED  no    VITALS  /73 (BP Location: Left arm, Patient Position: Sitting, Cuff Size: Adult Regular)   Pulse 100   Temp 99.5  F (37.5  C) (Oral)   Resp 16   Wt 80.2 kg (176 lb 14.4 oz)   SpO2 97%   BMI 32.36 kg/m      PACEMAKER/IMPLANTED CARDIAC DEVICE no    PAIN  Denies pain related to visit. Patient reports port a cath pain and feeding tube pain    PSYCHOSOCIAL  Marital Status: single  Patient lives in home alone  Number of children: unknown  Working status: unknown  Do you feel safe in your home? Yes    REVIEW OF SYSTEMS  Skin: negative  Eyes: positive for glasses  Ears/Nose/Throat: positive for dysphagia  Respiratory: positive for cough  Cardiovascular: negative  Gastrointestinal: positive for dysphagia, nausea, vomiting and feeding tube  Genitourinary: positive for frequency and urgency  Musculoskeletal: negative  Neurologic: positive for headaches  Psychiatric: negative  Hematologic/Lymphatic/Immunologic: positive for weight loss  Endocrine: negative    WOMEN ONLY  Any chance you may be pregnant: No, post menopausal    Radiation Oncology Patient Teaching    Current Concern: Esophageal Cancer    Person involved with teaching: Patient  Patient asked Questions: No  Patient was cooperative: Yes  Patient was receptive (willing to accept information given): Yes    Education Assessment  Comprehension ability: High  Knowledge level: Medium  Factors affecting teaching: None    Education Materials  Given  Side effects from Radiation Therapy- fatigue, swallow changes, nausea    Educational Topics Discussed  Side effects    Response To Teaching  Verbalizes understanding    Do you have an advanced directive or living will? no  Are you DNR/DNI? No    Joie Loyola RN

## 2022-03-14 NOTE — LETTER
3/14/2022         RE: Chuyita Bailey  [id number] [address] [address]        Dear Colleague,    Thank you for referring your patient, Chuyita Bailey, to the [facility] RADIATION ONCOLOG[facility] [place]. Please see a copy of my visit note below.    Dear Colleagues,  Today Chuyita Bailey was seen in consultation.    IDENTIFICATION: This is a 62yo woman with bipolar disorder and tobacco abuse recently diagnosed with distal esophageal SCCA clinical stage [id number] referred for neoadjuvant chemoradiation.    HISTORY OF PRESENT ILLNESS: Ms. Bailey is a current smoker (0.5ppd x 40years) with h/o ETOH abuse (she is 2 years sober). On January 17, 2022 she initially presented with a parapneumonic effusion with partial loculation causing shortness of breath and dyspnea.  There is an initial chest abdomen pelvis CT completed on this date.  In-house she also reported weight loss and dysphagia.      During her admission on Janury 18, 2022 she had a diagnostic and therapeutic thoracentesis with chest tube placement.  Path was negative for infection or any malignancy.  She also had a diagnostic EGD that found a large obstructing fungating mass in the distal esophagus and biopsies were consistent with squamous cell carcinoma. There was sonographic evidence suggesting invasion into the adventitia and for enlarged lymph nodes visualized in the AP region and subcarinal mediastinum, level 5 and level 7 respectively.  She was eventually treated with antibiotics with completion of her treatment on 2/14/2022.  Her hospitalization was complicated by hypoxic failure, volume overload and aspiration pneumonia.  She also had kidney injury.    On January 27, 2022 PET/CT showing an esophageal mass consistent with malignancy, small left axillary lymph node with moderate uptake and a small loculated right pleural effusion and mild peripheral uptake.  Left axillary ultrasound could not identify the lymph node and it was  thought that this was most likely a false positive.    On January 31, 2022 she had an EUS w/ FNA of the AP window and subcarinal nodes completed and they were negative for metastatic disease and granuloma.      She saw Dr. Mayda Gonsales in consultation on February 28, 2022 and plan is for neoadjuvant concurrent chemoradiation with carbotaxol.  She has also been seen by surgery already.    On March 7, 2022, She had a repeat chest abdomen pelvis CT completed on which showed large distal esophageal mass, small diaphragmatic lower paraesophageal lymph nodes, small upper abdominal nodes overall not significantly changed.  A mildly prominent right hilar lymph node was unchanged.    Currently she has a J-tube and is only able to take sips of water PO. She has significant dysphagia and odynophagia. She has nausea and vomiting and denies signficant pain. She denies any other new masses, skin changes, shortness of breath, chest or bony pain, or new neurologic symptoms. She is being seen here today for consideration of definitive chemoradiotherapy.      REVIEW OF SYSTEMS: As per HPI, a 14-point review of system is otherwise negative.    PAST RADIATION THERAPY:  Denies.    PAST CTD/PACEMAKER: Denies    Past Medical History:   Diagnosis Date     Alcohol use disorder, severe, in sustained remission (H) 1/17/2022    Formatting of this note might be different from the original. Reportedly sober for 1 year as of January 2022     Allergic rhinitis 6/28/2004    Formatting of this note might be different from the original. Rhinitis Allergic  NOS     Bipolar disorder (H) 7/10/2020     Degenerative disk disease 12/6/2012     Depressive disorder      Empyema of lung (H) 1/17/2022     Esophageal reflux 6/7/2003    Formatting of this note might be different from the original. Gastroesophageal Reflux Disease Formatting of this note might be different from the original. Formatting of this note might be different from the original.  Gastroesophageal Reflux Disease     ETOH abuse      Ganglion 10/18/2004    Formatting of this note might be different from the original. Ganglion Cyst     Insomnia due to other mental disorder 7/12/2019     Lactic acidosis 1/17/2022     Major depressive disorder, recurrent episode, moderate (H) 9/25/2007    Formatting of this note might be different from the original. Major depressive disorder, single episode, unspecified     Panic disorder 7/12/2019     Squamous cell carcinoma of esophagus (H) 3/11/2022     Tobacco use disorder, severe, dependence 6/28/2004    Formatting of this note might be different from the original. Tobacco Abuse     Vitamin D deficiency 7/3/2008     Weight loss 1/17/2022       Past Surgical History:   Procedure Laterality Date     EGD       ENDOSCOPIC ULTRASOUND, SIGMOIDOSCOPY, COMBINED       HYSTERECTOMY       INSERTION OF ACCESS PORT       TUBES         Family History   Problem Relation Age of Onset     Breast Cancer Mother      Lung Cancer Father      Breast Cancer Maternal Aunt      Lung Cancer Maternal Aunt        Social History     Tobacco Use     Smoking status: Current Every Day Smoker     Packs/day: 0.50     Years: 40.00     Pack years: 20.00     Smokeless tobacco: Never Used   Substance Use Topics     Alcohol use: Not Currently     Current Outpatient Medications   Medication     Nutritional Supplements (NUTREN 1.5) LIQD     ondansetron (ZOFRAN-ODT) 4 MG ODT tab     oxyCODONE (ROXICODONE) 5 MG tablet     albuterol (PROAIR HFA/PROVENTIL HFA/VENTOLIN HFA) 108 (90 Base) MCG/ACT inhaler     dexamethasone (DECADRON) 4 MG tablet     hydrOXYzine (VISTARIL) 25 MG capsule     loperamide (IMODIUM) 2 MG capsule     No current facility-administered medications for this visit.        Allergies   Allergen Reactions     Benzonatate Itching     Diazoxide      Other reaction(s): *Unknown     Doxycycline Nausea and Vomiting     Hydrochlorothiazide Muscle Pain (Myalgia)     Keflex [Cephalexin] Nausea  and Vomiting     Morphine Itching     Sulfa Drugs Hives     Triamterene-Hctz Other (See Comments)     Muscle Spasms     PHYSICAL EXAMINATION:  /73 (BP Location: Left arm, Patient Position: Sitting, Cuff Size: Adult Regular)   Pulse 100   Temp 99.5  F (37.5  C) (Oral)   Resp 16   Wt 80.2 kg (176 lb 14.4 oz)   SpO2 97%   BMI 32.36 kg/m    GENERAL        Well-appearing woman in no acute distress.  HEENT              Normocephalic, atraumatic.  Sclerae anicteric.  CVR                   Regular rate and rhythm.  No murmurs, rubs, or gallops.  LUNGS             Clear to auscultation bilaterally.  LYMPH             No supraclavicular, infraclavicular, or axillary lymphadenopathy appreciated bilaterally.  ABDOMEN      Soft.  J tube in place  EXT                    No clubbing or cyanosis or edema.    NEURO             No focal deficits.  MSK                   Good ROM.   SKIN                  Warm and well perfused.    PSYCH               Alert and oriented x 3     ECOG PERFORMANCE STATUS: 1     IMPRESSION/PLAN: Chuyita Bailey 60yo woman with bipolar disorder and tobacco abuse recently diagnosed with distal esophageal SCCA clinical stage [id number] referred for neoadjuvant chemoradiation.    I agree with concurrent chemoradiation. Results from the landmark CROSS Trial (van Terrell et. Al, [facility], 2012) have shown that patients with esophageal cancer treated with chemoradiotherapy can achieve a pathologic complete response in 29% of cases. Patients with SCC of the esophagus (vs. adenocarcinoma) faired better with chemoRT with a pCR of ~50%. In this context, combining chemotherapy with radiation therapy is essential. The superiority of definitive chemoRT over definitive RT alone was demonstrated in RT0 85-01. Although there were limitations to this study, in it patients with unresectable esophageal cancer were treated with either RT alone (64Gy), or RT (50Gy) with concurrent 5-FU/cisplatin. Median survival for the RT  alone group was 8.9 months, vs. 12.5 for combined therapy. Importantly, 5yr survival was 0% for the RT alone group and 26% for the combined therapy group.     Today I explained the rationale, as well as the logistics, risks, benefits, and alternatives to definitive chemoradiation for esophageal cancer. I encouraged Ms. Bailey to ask questions, which I answered to the best of my ability and to her satisfaction. She consented to treatment and completed a CT simulation treatment planning scan. Plan will be for 5.5 weeks of radiation concurrent with weekly carboplatin/paclitaxel. Our team will coordinate a start date with her Med Onc.  Potential treatment related toxicities include but are not limited to, skin erythema or desquamation, nausea, vomiting, odynophagia, worsening dysphagia, esophageal stricture or stenosis, esophageal perforation, pneumonitis, cardiac toxicity, fistula formation, development of pericardial effusion, rib fracture, spinal cord injury, and secondary cancer.     Additional problem list to be addressed in the following manner:     1. Carbo/Taxol Systemic treatment : Per Med Onc Dr. Mayda Gonsales  2. Active smoker: Today I questioned Chuyita Bailey about her current smoking habits.  Ms. Bailey is an active smoker and has some interest in smoking cessation.  We discussed the data regarding smoking and increased risk to several cancers.  We also discussed the data regarding delayed wound healing in active smokers.  All together 5-10 minutes was spent discussing the patients smoking status and she was advised about smoking cessation. We discussed the use of : Nicotine gum and Chantix.    There was ample time for questions and all were answered to the patient's satisfaction. Thank you for allowing me to participate in the care of this pleasant patient. If you have any questions, please do not hesitate to contact my office.     Sincerely,  Sandra Nicolas MD        Again, thank you for allowing me to  participate in the care of your patient.        Sincerely,        CLYDE Nicolas MD

## 2022-03-14 NOTE — PROGRESS NOTES
Attempted to contact patient to review start date for radiation and chemotherapy. Left message for patient to inform her of radiation start date on 3/29/2022 and that Dr. Gonsales's office was notified to coordinate chemotherapy and radiation. Request patient call Dr. Nicolas's office to verify she received start date information and to call with any questions or concerns.    Joie Loyola RN

## 2022-03-15 DIAGNOSIS — C15.9 SQUAMOUS CELL ESOPHAGEAL CANCER (H): Primary | ICD-10-CM

## 2022-03-30 ENCOUNTER — PATIENT OUTREACH (OUTPATIENT)
Dept: RADIATION ONCOLOGY | Facility: CLINIC | Age: 62
End: 2022-03-30

## 2022-03-30 NOTE — PROGRESS NOTES
Attempted to contact patient x 3 to discuss rescheduling PET/CT and radiation therapy at [facility]. Left voice mails x 3 to please return call to Dr. Nicolas's office at [phone] to discuss plan of care and patient's decision regarding treatments.     Joie Loyola RN

## 2022-09-01 ENCOUNTER — LAB REQUISITION (OUTPATIENT)
Dept: LAB | Facility: CLINIC | Age: 62
End: 2022-09-01
Payer: COMMERCIAL

## 2022-09-01 DIAGNOSIS — N39.41 URGE INCONTINENCE: ICD-10-CM

## 2022-09-01 DIAGNOSIS — R35.0 FREQUENCY OF MICTURITION: ICD-10-CM

## 2022-09-01 LAB
ALBUMIN UR-MCNC: 50 MG/DL
APPEARANCE UR: ABNORMAL
BILIRUB UR QL STRIP: NEGATIVE
CAOX CRY #/AREA URNS HPF: ABNORMAL /HPF
COLOR UR AUTO: YELLOW
GLUCOSE UR STRIP-MCNC: NEGATIVE MG/DL
HGB UR QL STRIP: ABNORMAL
KETONES UR STRIP-MCNC: NEGATIVE MG/DL
LEUKOCYTE ESTERASE UR QL STRIP: ABNORMAL
NITRATE UR QL: NEGATIVE
PH UR STRIP: 6 [PH] (ref 5–7)
RBC URINE: 38 /HPF
SP GR UR STRIP: 1.02 (ref 1–1.03)
UROBILINOGEN UR STRIP-MCNC: NORMAL MG/DL
WBC CLUMPS #/AREA URNS HPF: PRESENT /HPF
WBC URINE: >182 /HPF

## 2022-09-01 PROCEDURE — 81001 URINALYSIS AUTO W/SCOPE: CPT | Mod: ORL | Performed by: ORTHOPAEDIC SURGERY

## 2022-09-01 PROCEDURE — 87186 SC STD MICRODIL/AGAR DIL: CPT | Mod: ORL | Performed by: ORTHOPAEDIC SURGERY

## 2022-09-03 LAB — BACTERIA UR CULT: ABNORMAL
